# Patient Record
Sex: FEMALE | Race: WHITE | Employment: UNEMPLOYED | URBAN - METROPOLITAN AREA
[De-identification: names, ages, dates, MRNs, and addresses within clinical notes are randomized per-mention and may not be internally consistent; named-entity substitution may affect disease eponyms.]

---

## 2020-10-13 ENCOUNTER — HOSPITAL ENCOUNTER (OUTPATIENT)
Dept: PREADMISSION TESTING | Age: 60
Discharge: HOME | End: 2020-10-17
Payer: COMMERCIAL

## 2020-10-13 VITALS
DIASTOLIC BLOOD PRESSURE: 89 MMHG | WEIGHT: 185 LBS | BODY MASS INDEX: 31.58 KG/M2 | OXYGEN SATURATION: 97 % | HEART RATE: 88 BPM | TEMPERATURE: 96.7 F | HEIGHT: 64 IN | RESPIRATION RATE: 20 BRPM | SYSTOLIC BLOOD PRESSURE: 162 MMHG

## 2020-10-13 LAB
-: ABNORMAL
-: NORMAL
ABSOLUTE EOS #: 0.18 K/UL (ref 0–0.44)
ABSOLUTE IMMATURE GRANULOCYTE: 0.03 K/UL (ref 0–0.3)
ABSOLUTE LYMPH #: 4.57 K/UL (ref 1.1–3.7)
ABSOLUTE MONO #: 0.94 K/UL (ref 0.1–1.2)
AMORPHOUS: ABNORMAL
ANION GAP SERPL CALCULATED.3IONS-SCNC: 10 MMOL/L (ref 9–17)
BACTERIA: ABNORMAL
BASOPHILS # BLD: 0 % (ref 0–2)
BASOPHILS ABSOLUTE: 0.03 K/UL (ref 0–0.2)
BILIRUBIN URINE: NEGATIVE
BUN BLDV-MCNC: 14 MG/DL (ref 8–23)
BUN/CREAT BLD: 20 (ref 9–20)
CALCIUM SERPL-MCNC: 9.3 MG/DL (ref 8.6–10.4)
CASTS UA: ABNORMAL /LPF (ref 0–2)
CHLORIDE BLD-SCNC: 103 MMOL/L (ref 98–107)
CO2: 22 MMOL/L (ref 20–31)
COLOR: YELLOW
COMMENT UA: ABNORMAL
CREAT SERPL-MCNC: 0.69 MG/DL (ref 0.5–0.9)
CRYSTALS, UA: ABNORMAL /HPF
DIFFERENTIAL TYPE: ABNORMAL
EOSINOPHILS RELATIVE PERCENT: 2 % (ref 1–4)
EPITHELIAL CELLS UA: ABNORMAL /HPF (ref 0–5)
ESTIMATED AVERAGE GLUCOSE: 180 MG/DL
GFR AFRICAN AMERICAN: >60 ML/MIN
GFR NON-AFRICAN AMERICAN: >60 ML/MIN
GFR SERPL CREATININE-BSD FRML MDRD: ABNORMAL ML/MIN/{1.73_M2}
GFR SERPL CREATININE-BSD FRML MDRD: ABNORMAL ML/MIN/{1.73_M2}
GLUCOSE BLD-MCNC: 175 MG/DL (ref 70–99)
GLUCOSE URINE: NEGATIVE
HBA1C MFR BLD: 7.9 % (ref 4–6)
HCT VFR BLD CALC: 44 % (ref 36.3–47.1)
HEMOGLOBIN: 14.7 G/DL (ref 11.9–15.1)
IMMATURE GRANULOCYTES: 0 %
INR BLD: 1
KETONES, URINE: NEGATIVE
LEUKOCYTE ESTERASE, URINE: NEGATIVE
LYMPHOCYTES # BLD: 47 % (ref 24–43)
MCH RBC QN AUTO: 30.9 PG (ref 25.2–33.5)
MCHC RBC AUTO-ENTMCNC: 33.4 G/DL (ref 28.4–34.8)
MCV RBC AUTO: 92.4 FL (ref 82.6–102.9)
MONOCYTES # BLD: 9 % (ref 3–12)
MUCUS: ABNORMAL
NITRITE, URINE: NEGATIVE
NRBC AUTOMATED: 0 PER 100 WBC
OTHER OBSERVATIONS UA: ABNORMAL
PARTIAL THROMBOPLASTIN TIME: 19.1 SEC (ref 23.9–33.8)
PDW BLD-RTO: 13.6 % (ref 11.8–14.4)
PH UA: 6 (ref 5–8)
PLATELET # BLD: 268 K/UL (ref 138–453)
PLATELET ESTIMATE: ABNORMAL
PMV BLD AUTO: 12.9 FL (ref 8.1–13.5)
POTASSIUM SERPL-SCNC: 4.4 MMOL/L (ref 3.7–5.3)
PROTEIN UA: ABNORMAL
PROTHROMBIN TIME: 12.5 SEC (ref 11.5–14.2)
RBC # BLD: 4.76 M/UL (ref 3.95–5.11)
RBC # BLD: ABNORMAL 10*6/UL
RBC UA: ABNORMAL /HPF (ref 0–2)
REASON FOR REJECTION: NORMAL
RENAL EPITHELIAL, UA: ABNORMAL /HPF
SEG NEUTROPHILS: 42 % (ref 36–65)
SEGMENTED NEUTROPHILS ABSOLUTE COUNT: 4.23 K/UL (ref 1.5–8.1)
SODIUM BLD-SCNC: 135 MMOL/L (ref 135–144)
SPECIFIC GRAVITY UA: 1.02 (ref 1–1.03)
TRICHOMONAS: ABNORMAL
TURBIDITY: CLEAR
URINE HGB: NEGATIVE
UROBILINOGEN, URINE: NORMAL
WBC # BLD: 10 K/UL (ref 3.5–11.3)
WBC # BLD: ABNORMAL 10*3/UL
WBC UA: ABNORMAL /HPF (ref 0–5)
YEAST: ABNORMAL
ZZ NTE CLEAN UP: ORDERED TEST: NORMAL
ZZ NTE WITH NAME CLEAN UP: SPECIMEN SOURCE: NORMAL

## 2020-10-13 PROCEDURE — 85730 THROMBOPLASTIN TIME PARTIAL: CPT

## 2020-10-13 PROCEDURE — 81001 URINALYSIS AUTO W/SCOPE: CPT

## 2020-10-13 PROCEDURE — 85610 PROTHROMBIN TIME: CPT

## 2020-10-13 PROCEDURE — 80048 BASIC METABOLIC PNL TOTAL CA: CPT

## 2020-10-13 PROCEDURE — 83036 HEMOGLOBIN GLYCOSYLATED A1C: CPT

## 2020-10-13 PROCEDURE — 36415 COLL VENOUS BLD VENIPUNCTURE: CPT

## 2020-10-13 PROCEDURE — 87086 URINE CULTURE/COLONY COUNT: CPT

## 2020-10-13 PROCEDURE — 85025 COMPLETE CBC W/AUTO DIFF WBC: CPT

## 2020-10-13 PROCEDURE — 87641 MR-STAPH DNA AMP PROBE: CPT

## 2020-10-13 RX ORDER — TRIAMCINOLONE ACETONIDE 1 MG/ML
LOTION TOPICAL 3 TIMES DAILY
COMMUNITY

## 2020-10-13 RX ORDER — INSULIN GLARGINE 100 [IU]/ML
18 INJECTION, SOLUTION SUBCUTANEOUS NIGHTLY
COMMUNITY

## 2020-10-13 RX ORDER — PREGABALIN 75 MG/1
75 CAPSULE ORAL 2 TIMES DAILY
COMMUNITY

## 2020-10-13 RX ORDER — PRAVASTATIN SODIUM 10 MG
10 TABLET ORAL DAILY
COMMUNITY

## 2020-10-13 RX ORDER — ESTROGEN,CON/M-PROGEST ACET 0.625-2.5
1 TABLET ORAL DAILY
COMMUNITY

## 2020-10-13 RX ORDER — DICLOFENAC SODIUM 1 MG/ML
1 SOLUTION/ DROPS OPHTHALMIC 4 TIMES DAILY
COMMUNITY

## 2020-10-13 ASSESSMENT — PAIN DESCRIPTION - DESCRIPTORS: DESCRIPTORS: CONSTANT

## 2020-10-13 ASSESSMENT — PAIN DESCRIPTION - ORIENTATION: ORIENTATION: LOWER

## 2020-10-13 ASSESSMENT — PAIN DESCRIPTION - PAIN TYPE: TYPE: CHRONIC PAIN

## 2020-10-13 ASSESSMENT — PAIN DESCRIPTION - FREQUENCY: FREQUENCY: CONTINUOUS

## 2020-10-13 ASSESSMENT — PAIN DESCRIPTION - LOCATION: LOCATION: NECK

## 2020-10-13 ASSESSMENT — PAIN SCALES - GENERAL: PAINLEVEL_OUTOF10: 2

## 2020-10-13 NOTE — H&P
History and Physical Service   [facility] [place] - INPATIENT    HISTORY AND PHYSICAL EXAMINATION            Date of Evaluation: 10/13/2020  Patient name:  Heide Gross  MRN:   [id number]  YOB: 1960  PCP:    Nancy Saenz MD    History Obtained From:     Patient, medical records    History of Present Illness: This is Heide Gross a 56 y.o. female who presents to Pre Admission Testing for upcoming Anterior cervical laminectomy fusion C4-C6 by Dr. Jamaica Godoy for cervical spondylolisthesis scheduled 11/2/20 @ 0730  Patient c/o aching neck pain with headaches past several years that have progressively worsened. Admits neck \"locks up at times\" and \"I must manually move head out of position to realign\" This is accompanied by numbness in the left arm 5th finger  She follows with Dr Patsy Valdez with last visit 9/10/20 His progress notes indicated \"severe cervical stenosis C4-7 with cervical herniation at C4-C5 causing indentation of spinal cord\". Patient tried physical therapy, neck traction, and had spinal injections with pain management in past w/o significant improvement. She was referred for EMG 9/1/20 with Dr Marland Boxer which showed\" bilateral carpal tunnel and left cubital tunnel syndrome\". She takes Tylenol or Aleve as needed for relief of headache. Heat helps somewhat and cold makes it worse. Due to constant discomfort, surgery was advised. Hx Asthma, COPD and JESUS on CPAP nightly  Follows with Dr Paula Chatterjee. Ahmed. No home O2 or use of antiasthmatics. Denies dyspnea at rest or with exertion, cough, or wheezing. Hx pancreatic neuroendocrine tumor 10/4/19  S/p robotic distal pancreatectomy and splenectomy by Dr Marlena Pinto 11/26/19 with regular visits. (refer to Care Everywhere for extensive notes, post op recovery, complications and additional surgeries)  Follows with Oncologist Dr Ana Luisa Ramsey No chemotherapy  PET was negative per patient. Appointment 11/2020 with oncologist and repeat PET scan. Past Medical History:     Past Medical History:   Diagnosis Date    Arthritis     Asthma     Cancer ([facility] [address] 2019    neuroendocrine cancer    [facility]     Depression     Diabetes mellitus ([facility]     Hyperlipidemia     Hypertension     JESUS on CPAP     Sleep apnea         Past Surgical History:     Past Surgical History:   Procedure Laterality Date    ABDOMINOPLASTY      BREAST ENHANCEMENT SURGERY Bilateral     CHOLECYSTECTOMY      COLONOSCOPY      ENDOSCOPY, COLON, DIAGNOSTIC      KNEE ARTHROSCOPY Right     KNEE SURGERY Left     partial implant    NASAL SEPTUM SURGERY      PANCREAS SURGERY      partial     SHOULDER SURGERY Bilateral     rotattor cuff tightened    SKIN BIOPSY      SPLENECTOMY          Medications Prior to Admission:     Prior to Admission medications    Medication Sig Start Date End Date Taking? Authorizing Provider   estrogen, conjugated,-medroxyPROGESTERone (PREMPRO) 0.625-2.5 MG per tablet Take 1 tablet by mouth daily   Yes Historical Provider, MD   pregabalin (LYRICA) 75 MG capsule Take 75 mg by mouth 2 times daily. Yes Historical Provider, MD   pravastatin (PRAVACHOL) 10 MG tablet Take 10 mg by mouth daily   Yes Historical Provider, MD   insulin lispro (HUMALOG) 100 UNIT/ML injection vial Inject into the skin 3 times daily (before meals)   Yes Historical Provider, MD   insulin glargine (LANTUS) 100 UNIT/ML injection vial Inject 18 Units into the skin nightly   Yes Historical Provider, MD   diclofenac (VOLTAREN) 0.1 % ophthalmic solution 1 drop 4 times daily   Yes Historical Provider, MD   diclofenac sodium (VOLTAREN) 1 % GEL Apply 2 g topically 4 times daily as needed for Pain   Yes Historical Provider, MD   triamcinolone (KENALOG) 0.1 % lotion Apply topically 3 times daily Apply topically 3 times daily.    Yes Historical Provider, MD        Allergies:     Cellcept [mycophenolate mofetil]    Social History:     Tobacco:    reports that she quit smoking about 20 years ago. She smoked 1.00 pack per day. She has never used smokeless tobacco.  Alcohol:      reports current alcohol use. Drug Use:  reports no history of drug use. Family History:     History reviewed. No pertinent family history. Review of Systems:     Positive and Negative as described in HPI. CONSTITUTIONAL:  negative for fevers, chills, sweats, fatigue, weight loss  HEENT: lower left tooth capped 10/13/20 Advised to call Dentist if develops problems with tooth before surgery regular eye exams with Dr Parminder Umanzor Wears reading glasses  negative for vision, hearing changes, runny nose, throat pain  RESPIRATORY: +Asthma, COPD, JESUS using CPAP nightly Follows with Dr Ruthy Kirby. Ahmed negative for shortness of breath, cough, congestion, wheezing. CARDIOVASCULAR:  negative for chest pain, palpitations. GASTROINTESTINAL: See HPI Follows with Surgeon, Dr Murali Hagen and Onoclogisit Dr Ramos Gibbons negative for nausea, vomiting, diarrhea, constipation, change in bowel habits, abdominal pain   GENITOURINARY:  negative for difficulty of urination, burning with urination, frequency   BREAST: Hx saline breast implants 40 years ago currently leaking. Future surgery to be scheduled by Plastics, Dr Brent Orozco:  negative for rash, skin lesions, easy bruising   HEMATOLOGIC/LYMPHATIC:  negative for swelling/edema   ALLERGIC/IMMUNOLOGIC:  negative for urticaria , itching  ENDOCRINE: +DM Sugars run usually 100-200 averaging 140's. Follows with Dr Nayana Asif with appointment next week negative increase in drinking, increase in urination, hot or cold intolerance  MUSCULOSKELETAL: See HPI  s/p right knee meniscus repair Dr Jonathon Colón 9/18/20 Pain improved and healed well  negative muscle aches, swelling of joints  NEUROLOGICAL: + headaches with neck pain  negative for headaches, dizziness, lightheadedness, numbness, pain, tingling extremities  BEHAVIOR/PSYCH:  negative for depression, anxiety    Physical Exam:   BP (!) 162/89   Pulse 88   Temp 96.7 °F (35.9 °C)   Resp 20   Ht 5' 4\" (1.626 m)   Wt 185 lb (83.9 kg)   SpO2 97%   BMI 31.76 kg/m²   No LMP recorded. Patient is postmenopausal. No results for input(s): POCGLU in the last 72 hours. General Appearance:  alert, well appearing, and in no acute distress  Mental status: oriented to person, place, and time with normal affect  Head:  normocephalic, atraumatic. Eye: no icterus, redness, pupils equal and reactive, extraocular eye movements intact, conjunctiva clear  Ear: normal external ear, no discharge, hearing intact  Nose:  no drainage noted  Mouth: mucous membranes moist   Neck: supple, no carotid bruits, thyroid not palpable  Lungs: Bilateral equal air entry, unlabored, clear to ausculation, no wheezing, rales or rhonchi, normal effort  Cardiovascular: HR 88  normal rate, regular rhythm, no murmur, gallop, rub.   Abdomen: Soft, nontender, nondistended, normal bowel sounds   Neurologic: There are no new focal motor or sensory deficits, normal muscle tone and bulk, no abnormal sensation, normal speech, cranial nerves II through XII grossly intact Equal bilateral upper lower extremity strength  Skin: healed laparoscopic incisions right knee No gross visible lesions, rashes, bruising or bleeding on exposed skin area  Extremities/Neuro: decreased ROM cervical spine clicking with movement by patient  peripheral pulses palpable, no pedal edema or calf pain with palpation  Psych: normal affect     Investigations:      Laboratory Testing:  Recent Results (from the past 24 hour(s))   APTT    Collection Time: 10/13/20  2:23 PM   Result Value Ref Range    PTT 19.1 (L) 23.9 - 33.8 sec   PT    Collection Time: 10/13/20  2:23 PM   Result Value Ref Range    Protime 12.5 11.5 - 14.2 sec    INR 1.0    Basic Metabolic Prof    Collection Time: 10/13/20  2:23 PM   Result Value Ref Range    Glucose 175 (H) 70 - 99 mg/dL    BUN 14 8 - 23 mg/dL    CREATININE 0.69 0.50 - 0.90 mg/dL    Bun/Cre Ratio 20 9 - 20    Calcium 9.3 8.6 - 10.4 mg/dL    Sodium 135 135 - 144 mmol/L    Potassium 4.4 3.7 - 5.3 mmol/L    Chloride 103 98 - 107 mmol/L    CO2 22 20 - 31 mmol/L    Anion Gap 10 9 - 17 mmol/L    GFR Non-African American >60 >60 mL/min    GFR African American >60 >60 mL/min    GFR Comment          GFR Staging NOT REPORTED        Recent Labs     10/13/20  1423      K 4.4      CO2 22   BUN 14   CREATININE 0.69   GLUCOSE 175*   INR 1.0   PROTIME 12.5   APTT 19.1*       No results for input(s): COVID19 in the last 720 hours. Imaging/Diagnostics:    No results found. Diagnosis:      1. Cervical spondylolisthesis      Plans:     1.  Anterior cervical laminectomy fusion C4-C6        Chet Pitt APRN - CNP  10/13/2020  3:14 PM

## 2020-10-13 NOTE — PRE-PROCEDURE INSTRUCTIONS
Covid 10/29/2020 at 3:30pm (Lakisha Rodrigez in car)  [address] Monday,11/2/2020 at 5:30am  Please call [phone]    Once you enter the hospital lobby, take the elevators to the second floor. Check-In is at the surgery registration desk. Continue to take your home medications as you normally do up to and including the night before surgery with the exception of any blood thinning medications. Please stop any blood thinning medications as directed by your surgeon or prescribing physician. Failure to stop certain medications may interfere with your scheduled surgery. These may include:  Aspirin, Warfarin (Coumadin), Clopidogrel (Plavix), Ibuprofen (Motrin, Advil), Naproxen (Aleve), Meloxicam (Mobic), Celecoxib (Celebrex), Eliquis, Pradaxa, Xarelto, Effient, Fish Oil, Herbal supplements. none    If you are diabetic, do not take any of your diabetic medications by mouth the morning of surgery. If you are taking insulin contact the doctor that manages your diabetes for instructions about any changes to your insulin dosages the day before surgery. Do not inject insulin or other injectable diabetic medications the morning of surgery unless otherwise instructed by the doctor who manages your diabetes. Please take the following medication(s) the day of surgery with a small sip of water: none   Please use your inhalers at home the day of surgery. PREPARING FOR YOUR SURGERY:     Before surgery, you can play an important role in your own health. Because skin is not sterile, we need to be sure that your skin is as free of germs as possible before surgery by carefully washing before surgery. Preparing or prepping skin before surgery can reduce the risk of a surgical site infection.   Do not shave the area of your body where your surgery will be performed unless you received specific permission from your physician.     You will need to shower at home the night before surgery and the morning of your eyeglasses and case with you. No contacts are to be worn the day of surgery. You also may bring your hearing aids. Most surgical procedures involving anesthesia will require that you remove your dentures prior to surgery.  If you are on C-PAP or Bi-PAP at home and plan on staying in the hospital overnight for your surgery please bring the machine with you. · Do not wear any jewelry or body piercings day of surgery. Also, NO lotion, perfume or deodorant to be used the day of surgery. No nail polish on the operative extremity (arm/leg surgeries)    · Do not bring any valuables such as jewelry, cash, or credit cards. If you are staying overnight with us, please bring a small bag of personal items.  Please wear loose, comfortable clothing. If you are potentially going to have a cast or brace bring clothing that will fit over them.  In case of illness - If you have cold or flu like symptoms (high fever, runny nose, sore throat, cough, etc.) rash, nausea, vomiting, loose stools, and/or recent contact with someone who has a contagious disease (chicken pox, measles, etc.) Please call your doctor before coming to the hospital.     If your child is having surgery please make arrangements for any other children to be cared for at home on the day of surgery. Other children are not permitted in recovery room and we want you to be able to spend time with the patient. If other arrangements are not available then we suggest that you have a second adult to stay in the waiting room. Day of Surgery/Procedure:    As a patient at [facility] - INPATIENT you can expect quality medical and nursing care that is centered on your individual needs. Our goal is to make your surgical experience as comfortable as possible    . Transportation After Your Surgery/Procedure:     You will need a friend or family member to drive you home after your procedure. Your  must be 25years of age or older and able to sign off on your discharge instructions. A taxi cab or any other form of public transportation is not acceptable. Your friend or family member must stay at the hospital throughout your procedure. Someone must remain with you for the first 24 hours after your surgery if you receive anesthesia or medication. If you do not have someone to stay with you, your procedure may be cancelled.       If you have any other questions regarding your procedure or the day of surgery, please call [phone]      _________________________  ____________________________  Signature (Patient)              Signature (Provider) & date

## 2020-10-14 LAB
CULTURE: NORMAL
DIRECT EXAM: NORMAL
Lab: NORMAL
Lab: NORMAL
SPECIMEN DESCRIPTION: NORMAL
SPECIMEN DESCRIPTION: NORMAL

## 2020-10-29 ENCOUNTER — HOSPITAL ENCOUNTER (OUTPATIENT)
Dept: PREADMISSION TESTING | Age: 60
Setting detail: SPECIMEN
Discharge: HOME | End: 2020-11-02
Payer: COMMERCIAL

## 2020-10-29 PROCEDURE — U0003 INFECTIOUS AGENT DETECTION BY NUCLEIC ACID (DNA OR RNA); SEVERE ACUTE RESPIRATORY SYNDROME CORONAVIRUS 2 (SARS-COV-2) (CORONAVIRUS DISEASE [COVID-19]), AMPLIFIED PROBE TECHNIQUE, MAKING USE OF HIGH THROUGHPUT TECHNOLOGIES AS DESCRIBED BY CMS-2020-01-R: HCPCS

## 2020-10-31 LAB — SARS-COV-2, NAA: NOT DETECTED

## 2020-11-01 ENCOUNTER — ANESTHESIA EVENT (OUTPATIENT)
Dept: OPERATING ROOM | Age: 60
DRG: 473 | End: 2020-11-01
Payer: COMMERCIAL

## 2020-11-02 ENCOUNTER — APPOINTMENT (OUTPATIENT)
Dept: GENERAL RADIOLOGY | Age: 60
DRG: 473 | End: 2020-11-02
Attending: ORTHOPAEDIC SURGERY
Payer: COMMERCIAL

## 2020-11-02 ENCOUNTER — HOSPITAL ENCOUNTER (INPATIENT)
Age: 60
LOS: 1 days | Discharge: HOME | DRG: 473 | End: 2020-11-03
Attending: ORTHOPAEDIC SURGERY | Admitting: ORTHOPAEDIC SURGERY
Payer: COMMERCIAL

## 2020-11-02 ENCOUNTER — ANESTHESIA (OUTPATIENT)
Dept: OPERATING ROOM | Age: 60
DRG: 473 | End: 2020-11-02
Payer: COMMERCIAL

## 2020-11-02 VITALS — TEMPERATURE: 92.8 F | SYSTOLIC BLOOD PRESSURE: 177 MMHG | OXYGEN SATURATION: 93 % | DIASTOLIC BLOOD PRESSURE: 90 MMHG

## 2020-11-02 PROBLEM — E66.9 OBESITY (BMI 30-39.9): Status: ACTIVE | Noted: 2020-11-02

## 2020-11-02 PROBLEM — G47.33 OSA ON CPAP: Status: ACTIVE | Noted: 2020-11-02

## 2020-11-02 PROBLEM — M47.22 CERVICAL SPONDYLOSIS WITH RADICULOPATHY: Chronic | Status: ACTIVE | Noted: 2020-11-02

## 2020-11-02 PROBLEM — I10 UNCONTROLLED HYPERTENSION: Status: ACTIVE | Noted: 2020-11-02

## 2020-11-02 PROBLEM — Z99.89 OSA ON CPAP: Status: ACTIVE | Noted: 2020-11-02

## 2020-11-02 PROBLEM — E11.9 CONTROLLED TYPE 2 DIABETES MELLITUS WITHOUT COMPLICATION, WITH LONG-TERM CURRENT USE OF INSULIN (HCC): Status: ACTIVE | Noted: 2020-11-02

## 2020-11-02 PROBLEM — Z79.4 CONTROLLED TYPE 2 DIABETES MELLITUS WITHOUT COMPLICATION, WITH LONG-TERM CURRENT USE OF INSULIN (HCC): Status: ACTIVE | Noted: 2020-11-02

## 2020-11-02 LAB
ESTIMATED AVERAGE GLUCOSE: 174 MG/DL
GLUCOSE BLD-MCNC: 159 MG/DL (ref 65–105)
GLUCOSE BLD-MCNC: 209 MG/DL (ref 65–105)
GLUCOSE BLD-MCNC: 247 MG/DL (ref 65–105)
GLUCOSE BLD-MCNC: 277 MG/DL (ref 65–105)
HBA1C MFR BLD: 7.7 % (ref 4–6)

## 2020-11-02 PROCEDURE — 6360000002 HC RX W HCPCS: Performed by: ORTHOPAEDIC SURGERY

## 2020-11-02 PROCEDURE — 3700000000 HC ANESTHESIA ATTENDED CARE: Performed by: ORTHOPAEDIC SURGERY

## 2020-11-02 PROCEDURE — 6360000002 HC RX W HCPCS: Performed by: NURSE ANESTHETIST, CERTIFIED REGISTERED

## 2020-11-02 PROCEDURE — 0RG20A0 FUSION OF 2 OR MORE CERVICAL VERTEBRAL JOINTS WITH INTERBODY FUSION DEVICE, ANTERIOR APPROACH, ANTERIOR COLUMN, OPEN APPROACH: ICD-10-PCS | Performed by: ORTHOPAEDIC SURGERY

## 2020-11-02 PROCEDURE — 99254 IP/OBS CNSLTJ NEW/EST MOD 60: CPT | Performed by: INTERNAL MEDICINE

## 2020-11-02 PROCEDURE — 0RG2070 FUSION OF 2 OR MORE CERVICAL VERTEBRAL JOINTS WITH AUTOLOGOUS TISSUE SUBSTITUTE, ANTERIOR APPROACH, ANTERIOR COLUMN, OPEN APPROACH: ICD-10-PCS | Performed by: ORTHOPAEDIC SURGERY

## 2020-11-02 PROCEDURE — 6370000000 HC RX 637 (ALT 250 FOR IP): Performed by: ORTHOPAEDIC SURGERY

## 2020-11-02 PROCEDURE — C1713 ANCHOR/SCREW BN/BN,TIS/BN: HCPCS | Performed by: ORTHOPAEDIC SURGERY

## 2020-11-02 PROCEDURE — 3700000001 HC ADD 15 MINUTES (ANESTHESIA): Performed by: ORTHOPAEDIC SURGERY

## 2020-11-02 PROCEDURE — 2500000003 HC RX 250 WO HCPCS: Performed by: ORTHOPAEDIC SURGERY

## 2020-11-02 PROCEDURE — 2580000003 HC RX 258: Performed by: ANESTHESIOLOGY

## 2020-11-02 PROCEDURE — 7100000000 HC PACU RECOVERY - FIRST 15 MIN: Performed by: ORTHOPAEDIC SURGERY

## 2020-11-02 PROCEDURE — 83036 HEMOGLOBIN GLYCOSYLATED A1C: CPT

## 2020-11-02 PROCEDURE — 3600000004 HC SURGERY LEVEL 4 BASE: Performed by: ORTHOPAEDIC SURGERY

## 2020-11-02 PROCEDURE — 7100000001 HC PACU RECOVERY - ADDTL 15 MIN: Performed by: ORTHOPAEDIC SURGERY

## 2020-11-02 PROCEDURE — 2500000003 HC RX 250 WO HCPCS: Performed by: NURSE ANESTHETIST, CERTIFIED REGISTERED

## 2020-11-02 PROCEDURE — 2580000003 HC RX 258: Performed by: ORTHOPAEDIC SURGERY

## 2020-11-02 PROCEDURE — 1200000000 HC SEMI PRIVATE

## 2020-11-02 PROCEDURE — 2780000010 HC IMPLANT OTHER: Performed by: ORTHOPAEDIC SURGERY

## 2020-11-02 PROCEDURE — 82947 ASSAY GLUCOSE BLOOD QUANT: CPT

## 2020-11-02 PROCEDURE — 0RB30ZZ EXCISION OF CERVICAL VERTEBRAL DISC, OPEN APPROACH: ICD-10-PCS | Performed by: ORTHOPAEDIC SURGERY

## 2020-11-02 PROCEDURE — 36415 COLL VENOUS BLD VENIPUNCTURE: CPT

## 2020-11-02 PROCEDURE — 3209999900 FLUORO FOR SURGICAL PROCEDURES

## 2020-11-02 PROCEDURE — 6370000000 HC RX 637 (ALT 250 FOR IP): Performed by: INTERNAL MEDICINE

## 2020-11-02 PROCEDURE — 3600000014 HC SURGERY LEVEL 4 ADDTL 15MIN: Performed by: ORTHOPAEDIC SURGERY

## 2020-11-02 PROCEDURE — 2709999900 HC NON-CHARGEABLE SUPPLY: Performed by: ORTHOPAEDIC SURGERY

## 2020-11-02 PROCEDURE — 6360000002 HC RX W HCPCS: Performed by: ANESTHESIOLOGY

## 2020-11-02 PROCEDURE — 2720000010 HC SURG SUPPLY STERILE: Performed by: ORTHOPAEDIC SURGERY

## 2020-11-02 PROCEDURE — 2500000003 HC RX 250 WO HCPCS: Performed by: INTERNAL MEDICINE

## 2020-11-02 DEVICE — IMPLANT 4240864 ANATOMIC C 16X14X 8MM
Type: IMPLANTABLE DEVICE | Site: TRACHEA | Status: FUNCTIONAL
Brand: ANATOMIC PEEK CERVICAL FUSION SYSTEM

## 2020-11-02 DEVICE — IMPLANT 6240841 ANATOMIC 14X11X8MM
Type: IMPLANTABLE DEVICE | Site: NECK | Status: FUNCTIONAL
Brand: VERTE-STACK® SPINAL SYSTEM

## 2020-11-02 DEVICE — PLATE 3002037 ZEVO 37MM 2 LVL
Type: IMPLANTABLE DEVICE | Site: NECK | Status: FUNCTIONAL
Brand: ZEVO™ ANTERIOR CERVICAL PLATE SYSTEM

## 2020-11-02 DEVICE — GRAFT BNE SUB 1CC CELLULAR MTRX OSTEOCEL +: Type: IMPLANTABLE DEVICE | Site: NECK | Status: FUNCTIONAL

## 2020-11-02 RX ORDER — DEXTROSE MONOHYDRATE 50 MG/ML
100 INJECTION, SOLUTION INTRAVENOUS PRN
Status: DISCONTINUED | OUTPATIENT
Start: 2020-11-02 | End: 2020-11-03 | Stop reason: HOSPADM

## 2020-11-02 RX ORDER — SENNA AND DOCUSATE SODIUM 50; 8.6 MG/1; MG/1
1 TABLET, FILM COATED ORAL 2 TIMES DAILY
Status: DISCONTINUED | OUTPATIENT
Start: 2020-11-02 | End: 2020-11-03 | Stop reason: HOSPADM

## 2020-11-02 RX ORDER — OXYCODONE HYDROCHLORIDE AND ACETAMINOPHEN 5; 325 MG/1; MG/1
2 TABLET ORAL EVERY 4 HOURS PRN
Status: DISCONTINUED | OUTPATIENT
Start: 2020-11-02 | End: 2020-11-03 | Stop reason: HOSPADM

## 2020-11-02 RX ORDER — ROCURONIUM BROMIDE 10 MG/ML
INJECTION, SOLUTION INTRAVENOUS PRN
Status: DISCONTINUED | OUTPATIENT
Start: 2020-11-02 | End: 2020-11-02 | Stop reason: SDUPTHER

## 2020-11-02 RX ORDER — PREGABALIN 75 MG/1
75 CAPSULE ORAL 2 TIMES DAILY
Status: DISCONTINUED | OUTPATIENT
Start: 2020-11-02 | End: 2020-11-03 | Stop reason: HOSPADM

## 2020-11-02 RX ORDER — SODIUM CHLORIDE 0.9 % (FLUSH) 0.9 %
10 SYRINGE (ML) INJECTION PRN
Status: DISCONTINUED | OUTPATIENT
Start: 2020-11-02 | End: 2020-11-02 | Stop reason: HOSPADM

## 2020-11-02 RX ORDER — PHENYLEPHRINE HCL IN 0.9% NACL 1 MG/10 ML
SYRINGE (ML) INTRAVENOUS PRN
Status: DISCONTINUED | OUTPATIENT
Start: 2020-11-02 | End: 2020-11-02 | Stop reason: SDUPTHER

## 2020-11-02 RX ORDER — SODIUM CHLORIDE 9 MG/ML
INJECTION, SOLUTION INTRAVENOUS CONTINUOUS
Status: DISCONTINUED | OUTPATIENT
Start: 2020-11-03 | End: 2020-11-02

## 2020-11-02 RX ORDER — OXYCODONE HYDROCHLORIDE AND ACETAMINOPHEN 5; 325 MG/1; MG/1
1 TABLET ORAL
Status: DISCONTINUED | OUTPATIENT
Start: 2020-11-02 | End: 2020-11-02 | Stop reason: HOSPADM

## 2020-11-02 RX ORDER — LABETALOL HYDROCHLORIDE 5 MG/ML
20 INJECTION, SOLUTION INTRAVENOUS EVERY 4 HOURS PRN
Status: DISCONTINUED | OUTPATIENT
Start: 2020-11-02 | End: 2020-11-03 | Stop reason: HOSPADM

## 2020-11-02 RX ORDER — FENTANYL CITRATE 50 UG/ML
50 INJECTION, SOLUTION INTRAMUSCULAR; INTRAVENOUS EVERY 5 MIN PRN
Status: DISCONTINUED | OUTPATIENT
Start: 2020-11-02 | End: 2020-11-02 | Stop reason: HOSPADM

## 2020-11-02 RX ORDER — SODIUM CHLORIDE, SODIUM LACTATE, POTASSIUM CHLORIDE, CALCIUM CHLORIDE 600; 310; 30; 20 MG/100ML; MG/100ML; MG/100ML; MG/100ML
INJECTION, SOLUTION INTRAVENOUS CONTINUOUS
Status: DISCONTINUED | OUTPATIENT
Start: 2020-11-03 | End: 2020-11-02

## 2020-11-02 RX ORDER — SODIUM CHLORIDE 9 MG/ML
INJECTION, SOLUTION INTRAVENOUS CONTINUOUS
Status: DISCONTINUED | OUTPATIENT
Start: 2020-11-02 | End: 2020-11-02

## 2020-11-02 RX ORDER — KETAMINE HCL IN NACL, ISO-OSM 100MG/10ML
SYRINGE (ML) INJECTION PRN
Status: DISCONTINUED | OUTPATIENT
Start: 2020-11-02 | End: 2020-11-02 | Stop reason: SDUPTHER

## 2020-11-02 RX ORDER — MORPHINE SULFATE 2 MG/ML
2 INJECTION, SOLUTION INTRAMUSCULAR; INTRAVENOUS
Status: DISCONTINUED | OUTPATIENT
Start: 2020-11-02 | End: 2020-11-03 | Stop reason: HOSPADM

## 2020-11-02 RX ORDER — PROMETHAZINE HYDROCHLORIDE 12.5 MG/1
12.5 TABLET ORAL EVERY 6 HOURS PRN
Status: DISCONTINUED | OUTPATIENT
Start: 2020-11-02 | End: 2020-11-03 | Stop reason: HOSPADM

## 2020-11-02 RX ORDER — FENTANYL CITRATE 50 UG/ML
25 INJECTION, SOLUTION INTRAMUSCULAR; INTRAVENOUS EVERY 5 MIN PRN
Status: DISCONTINUED | OUTPATIENT
Start: 2020-11-02 | End: 2020-11-02 | Stop reason: HOSPADM

## 2020-11-02 RX ORDER — MIDAZOLAM HYDROCHLORIDE 1 MG/ML
INJECTION INTRAMUSCULAR; INTRAVENOUS PRN
Status: DISCONTINUED | OUTPATIENT
Start: 2020-11-02 | End: 2020-11-02 | Stop reason: SDUPTHER

## 2020-11-02 RX ORDER — GLYCOPYRROLATE 1 MG/5 ML
SYRINGE (ML) INTRAVENOUS PRN
Status: DISCONTINUED | OUTPATIENT
Start: 2020-11-02 | End: 2020-11-02 | Stop reason: SDUPTHER

## 2020-11-02 RX ORDER — FENTANYL CITRATE 50 UG/ML
INJECTION, SOLUTION INTRAMUSCULAR; INTRAVENOUS PRN
Status: DISCONTINUED | OUTPATIENT
Start: 2020-11-02 | End: 2020-11-02 | Stop reason: SDUPTHER

## 2020-11-02 RX ORDER — LIDOCAINE HYDROCHLORIDE 10 MG/ML
1 INJECTION, SOLUTION EPIDURAL; INFILTRATION; INTRACAUDAL; PERINEURAL
Status: DISCONTINUED | OUTPATIENT
Start: 2020-11-02 | End: 2020-11-02 | Stop reason: HOSPADM

## 2020-11-02 RX ORDER — PRAVASTATIN SODIUM 10 MG
10 TABLET ORAL DAILY
Status: DISCONTINUED | OUTPATIENT
Start: 2020-11-02 | End: 2020-11-03 | Stop reason: HOSPADM

## 2020-11-02 RX ORDER — INSULIN GLARGINE 100 [IU]/ML
18 INJECTION, SOLUTION SUBCUTANEOUS NIGHTLY
Status: DISCONTINUED | OUTPATIENT
Start: 2020-11-02 | End: 2020-11-03 | Stop reason: HOSPADM

## 2020-11-02 RX ORDER — ONDANSETRON 2 MG/ML
4 INJECTION INTRAMUSCULAR; INTRAVENOUS EVERY 6 HOURS PRN
Status: DISCONTINUED | OUTPATIENT
Start: 2020-11-02 | End: 2020-11-03 | Stop reason: HOSPADM

## 2020-11-02 RX ORDER — DEXAMETHASONE SODIUM PHOSPHATE 10 MG/ML
INJECTION, SOLUTION INTRAMUSCULAR; INTRAVENOUS PRN
Status: DISCONTINUED | OUTPATIENT
Start: 2020-11-02 | End: 2020-11-02 | Stop reason: SDUPTHER

## 2020-11-02 RX ORDER — OXYCODONE HYDROCHLORIDE AND ACETAMINOPHEN 5; 325 MG/1; MG/1
1 TABLET ORAL EVERY 4 HOURS PRN
Status: DISCONTINUED | OUTPATIENT
Start: 2020-11-02 | End: 2020-11-03 | Stop reason: HOSPADM

## 2020-11-02 RX ORDER — POLYETHYLENE GLYCOL 3350 17 G/17G
17 POWDER, FOR SOLUTION ORAL DAILY
Status: DISCONTINUED | OUTPATIENT
Start: 2020-11-02 | End: 2020-11-03 | Stop reason: HOSPADM

## 2020-11-02 RX ORDER — NICOTINE POLACRILEX 4 MG
15 LOZENGE BUCCAL PRN
Status: DISCONTINUED | OUTPATIENT
Start: 2020-11-02 | End: 2020-11-03 | Stop reason: HOSPADM

## 2020-11-02 RX ORDER — SODIUM CHLORIDE 0.9 % (FLUSH) 0.9 %
10 SYRINGE (ML) INJECTION EVERY 12 HOURS SCHEDULED
Status: DISCONTINUED | OUTPATIENT
Start: 2020-11-02 | End: 2020-11-02 | Stop reason: HOSPADM

## 2020-11-02 RX ORDER — ONDANSETRON 2 MG/ML
INJECTION INTRAMUSCULAR; INTRAVENOUS PRN
Status: DISCONTINUED | OUTPATIENT
Start: 2020-11-02 | End: 2020-11-02 | Stop reason: SDUPTHER

## 2020-11-02 RX ORDER — PROPOFOL 10 MG/ML
INJECTION, EMULSION INTRAVENOUS PRN
Status: DISCONTINUED | OUTPATIENT
Start: 2020-11-02 | End: 2020-11-02 | Stop reason: SDUPTHER

## 2020-11-02 RX ORDER — SODIUM CHLORIDE 0.9 % (FLUSH) 0.9 %
10 SYRINGE (ML) INJECTION PRN
Status: DISCONTINUED | OUTPATIENT
Start: 2020-11-02 | End: 2020-11-03 | Stop reason: HOSPADM

## 2020-11-02 RX ORDER — PROPOFOL 10 MG/ML
INJECTION, EMULSION INTRAVENOUS CONTINUOUS PRN
Status: DISCONTINUED | OUTPATIENT
Start: 2020-11-02 | End: 2020-11-02 | Stop reason: SDUPTHER

## 2020-11-02 RX ORDER — SODIUM CHLORIDE 0.9 % (FLUSH) 0.9 %
10 SYRINGE (ML) INJECTION EVERY 12 HOURS SCHEDULED
Status: DISCONTINUED | OUTPATIENT
Start: 2020-11-02 | End: 2020-11-03 | Stop reason: HOSPADM

## 2020-11-02 RX ORDER — TIZANIDINE 4 MG/1
4 TABLET ORAL EVERY 8 HOURS PRN
Status: DISCONTINUED | OUTPATIENT
Start: 2020-11-02 | End: 2020-11-03 | Stop reason: HOSPADM

## 2020-11-02 RX ORDER — LIDOCAINE HYDROCHLORIDE 20 MG/ML
INJECTION, SOLUTION EPIDURAL; INFILTRATION; INTRACAUDAL; PERINEURAL PRN
Status: DISCONTINUED | OUTPATIENT
Start: 2020-11-02 | End: 2020-11-02 | Stop reason: SDUPTHER

## 2020-11-02 RX ORDER — DEXTROSE MONOHYDRATE 25 G/50ML
12.5 INJECTION, SOLUTION INTRAVENOUS PRN
Status: DISCONTINUED | OUTPATIENT
Start: 2020-11-02 | End: 2020-11-03 | Stop reason: HOSPADM

## 2020-11-02 RX ORDER — ONDANSETRON 2 MG/ML
4 INJECTION INTRAMUSCULAR; INTRAVENOUS
Status: DISCONTINUED | OUTPATIENT
Start: 2020-11-02 | End: 2020-11-02 | Stop reason: HOSPADM

## 2020-11-02 RX ORDER — LOSARTAN POTASSIUM 50 MG/1
50 TABLET ORAL DAILY
Status: DISCONTINUED | OUTPATIENT
Start: 2020-11-02 | End: 2020-11-03

## 2020-11-02 RX ADMIN — CEFAZOLIN 2 G: 10 INJECTION, POWDER, FOR SOLUTION INTRAVENOUS at 07:46

## 2020-11-02 RX ADMIN — FENTANYL CITRATE 75 MCG: 50 INJECTION, SOLUTION INTRAMUSCULAR; INTRAVENOUS at 08:11

## 2020-11-02 RX ADMIN — DOCUSATE SODIUM 50MG AND SENNOSIDES 8.6MG 1 TABLET: 8.6; 5 TABLET, FILM COATED ORAL at 21:06

## 2020-11-02 RX ADMIN — ONDANSETRON 4 MG: 2 INJECTION, SOLUTION INTRAMUSCULAR; INTRAVENOUS at 07:40

## 2020-11-02 RX ADMIN — LABETALOL HYDROCHLORIDE 20 MG: 5 INJECTION INTRAVENOUS at 14:57

## 2020-11-02 RX ADMIN — OXYCODONE HYDROCHLORIDE AND ACETAMINOPHEN 2 TABLET: 5; 325 TABLET ORAL at 21:06

## 2020-11-02 RX ADMIN — INSULIN GLARGINE 18 UNITS: 100 INJECTION, SOLUTION SUBCUTANEOUS at 21:05

## 2020-11-02 RX ADMIN — FENTANYL CITRATE 50 MCG: 50 INJECTION, SOLUTION INTRAMUSCULAR; INTRAVENOUS at 10:10

## 2020-11-02 RX ADMIN — Medication 50 MG: at 07:50

## 2020-11-02 RX ADMIN — Medication 100 MCG: at 08:27

## 2020-11-02 RX ADMIN — DEXAMETHASONE SODIUM PHOSPHATE 10 MG: 10 INJECTION INTRAMUSCULAR; INTRAVENOUS at 07:40

## 2020-11-02 RX ADMIN — CEFAZOLIN SODIUM 2 G: 10 INJECTION, POWDER, FOR SOLUTION INTRAVENOUS at 13:00

## 2020-11-02 RX ADMIN — MORPHINE SULFATE 2 MG: 2 INJECTION, SOLUTION INTRAMUSCULAR; INTRAVENOUS at 14:49

## 2020-11-02 RX ADMIN — FENTANYL CITRATE 125 MCG: 50 INJECTION, SOLUTION INTRAMUSCULAR; INTRAVENOUS at 07:33

## 2020-11-02 RX ADMIN — Medication 50 MCG: at 11:33

## 2020-11-02 RX ADMIN — MORPHINE SULFATE 2 MG: 2 INJECTION, SOLUTION INTRAMUSCULAR; INTRAVENOUS at 17:29

## 2020-11-02 RX ADMIN — SODIUM CHLORIDE, POTASSIUM CHLORIDE, SODIUM LACTATE AND CALCIUM CHLORIDE: 600; 310; 30; 20 INJECTION, SOLUTION INTRAVENOUS at 09:56

## 2020-11-02 RX ADMIN — SODIUM CHLORIDE, POTASSIUM CHLORIDE, SODIUM LACTATE AND CALCIUM CHLORIDE: 600; 310; 30; 20 INJECTION, SOLUTION INTRAVENOUS at 06:23

## 2020-11-02 RX ADMIN — LOSARTAN POTASSIUM 50 MG: 50 TABLET, FILM COATED ORAL at 19:26

## 2020-11-02 RX ADMIN — ROCURONIUM BROMIDE 25 MG: 10 INJECTION, SOLUTION INTRAVENOUS at 09:15

## 2020-11-02 RX ADMIN — LIDOCAINE HYDROCHLORIDE 100 MG: 20 INJECTION, SOLUTION EPIDURAL; INFILTRATION; INTRACAUDAL; PERINEURAL at 07:34

## 2020-11-02 RX ADMIN — Medication 0.2 MG: at 07:40

## 2020-11-02 RX ADMIN — PROPOFOL 12 MCG/KG/MIN: 10 INJECTION, EMULSION INTRAVENOUS at 07:46

## 2020-11-02 RX ADMIN — INSULIN LISPRO 3 UNITS: 100 INJECTION, SOLUTION INTRAVENOUS; SUBCUTANEOUS at 21:06

## 2020-11-02 RX ADMIN — Medication 100 MCG: at 08:52

## 2020-11-02 RX ADMIN — CEFAZOLIN SODIUM 2 G: 10 INJECTION, POWDER, FOR SOLUTION INTRAVENOUS at 22:17

## 2020-11-02 RX ADMIN — SUGAMMADEX 200 MG: 100 INJECTION, SOLUTION INTRAVENOUS at 10:08

## 2020-11-02 RX ADMIN — MORPHINE SULFATE 2 MG: 2 INJECTION, SOLUTION INTRAMUSCULAR; INTRAVENOUS at 13:01

## 2020-11-02 RX ADMIN — PREGABALIN 75 MG: 75 CAPSULE ORAL at 21:06

## 2020-11-02 RX ADMIN — Medication 50 MCG: at 11:10

## 2020-11-02 RX ADMIN — ROCURONIUM BROMIDE 50 MG: 10 INJECTION, SOLUTION INTRAVENOUS at 07:34

## 2020-11-02 RX ADMIN — SODIUM CHLORIDE: 9 INJECTION, SOLUTION INTRAVENOUS at 13:00

## 2020-11-02 RX ADMIN — PROPOFOL 150 MG: 10 INJECTION, EMULSION INTRAVENOUS at 07:34

## 2020-11-02 RX ADMIN — ONDANSETRON 4 MG: 2 INJECTION INTRAMUSCULAR; INTRAVENOUS at 13:01

## 2020-11-02 RX ADMIN — Medication 50 MCG: at 10:52

## 2020-11-02 RX ADMIN — LABETALOL HYDROCHLORIDE 20 MG: 5 INJECTION INTRAVENOUS at 18:45

## 2020-11-02 RX ADMIN — INSULIN LISPRO 4 UNITS: 100 INJECTION, SOLUTION INTRAVENOUS; SUBCUTANEOUS at 17:30

## 2020-11-02 RX ADMIN — MIDAZOLAM 2 MG: 1 INJECTION INTRAMUSCULAR; INTRAVENOUS at 07:27

## 2020-11-02 ASSESSMENT — PAIN SCALES - GENERAL
PAINLEVEL_OUTOF10: 8
PAINLEVEL_OUTOF10: 9
PAINLEVEL_OUTOF10: 8
PAINLEVEL_OUTOF10: 10
PAINLEVEL_OUTOF10: 7
PAINLEVEL_OUTOF10: 9
PAINLEVEL_OUTOF10: 10
PAINLEVEL_OUTOF10: 8
PAINLEVEL_OUTOF10: 6
PAINLEVEL_OUTOF10: 6
PAINLEVEL_OUTOF10: 5

## 2020-11-02 ASSESSMENT — PULMONARY FUNCTION TESTS
PIF_VALUE: 21
PIF_VALUE: 23
PIF_VALUE: 22
PIF_VALUE: 19
PIF_VALUE: 24
PIF_VALUE: 22
PIF_VALUE: 21
PIF_VALUE: 0
PIF_VALUE: 23
PIF_VALUE: 22
PIF_VALUE: 17
PIF_VALUE: 22
PIF_VALUE: 23
PIF_VALUE: 19
PIF_VALUE: 22
PIF_VALUE: 16
PIF_VALUE: 18
PIF_VALUE: 18
PIF_VALUE: 21
PIF_VALUE: 22
PIF_VALUE: 22
PIF_VALUE: 23
PIF_VALUE: 19
PIF_VALUE: 21
PIF_VALUE: 23
PIF_VALUE: 23
PIF_VALUE: 20
PIF_VALUE: 20
PIF_VALUE: 23
PIF_VALUE: 22
PIF_VALUE: 22
PIF_VALUE: 21
PIF_VALUE: 16
PIF_VALUE: 21
PIF_VALUE: 23
PIF_VALUE: 3
PIF_VALUE: 23
PIF_VALUE: 23
PIF_VALUE: 21
PIF_VALUE: 23
PIF_VALUE: 19
PIF_VALUE: 23
PIF_VALUE: 19
PIF_VALUE: 23
PIF_VALUE: 16
PIF_VALUE: 0
PIF_VALUE: 23
PIF_VALUE: 19
PIF_VALUE: 24
PIF_VALUE: 24
PIF_VALUE: 22
PIF_VALUE: 23
PIF_VALUE: 1
PIF_VALUE: 20
PIF_VALUE: 1
PIF_VALUE: 2
PIF_VALUE: 23
PIF_VALUE: 19
PIF_VALUE: 22
PIF_VALUE: 22
PIF_VALUE: 23
PIF_VALUE: 22
PIF_VALUE: 22
PIF_VALUE: 16
PIF_VALUE: 23
PIF_VALUE: 24
PIF_VALUE: 2
PIF_VALUE: 23
PIF_VALUE: 22
PIF_VALUE: 24
PIF_VALUE: 23
PIF_VALUE: 22
PIF_VALUE: 23
PIF_VALUE: 22
PIF_VALUE: 19
PIF_VALUE: 22
PIF_VALUE: 23
PIF_VALUE: 22
PIF_VALUE: 23
PIF_VALUE: 22
PIF_VALUE: 16
PIF_VALUE: 23
PIF_VALUE: 23
PIF_VALUE: 21
PIF_VALUE: 22
PIF_VALUE: 1
PIF_VALUE: 16
PIF_VALUE: 1
PIF_VALUE: 23
PIF_VALUE: 1
PIF_VALUE: 18
PIF_VALUE: 23
PIF_VALUE: 24
PIF_VALUE: 2
PIF_VALUE: 16
PIF_VALUE: 22
PIF_VALUE: 23
PIF_VALUE: 22
PIF_VALUE: 23
PIF_VALUE: 3
PIF_VALUE: 0
PIF_VALUE: 20
PIF_VALUE: 24
PIF_VALUE: 23
PIF_VALUE: 23
PIF_VALUE: 1
PIF_VALUE: 23
PIF_VALUE: 22
PIF_VALUE: 23
PIF_VALUE: 24
PIF_VALUE: 19
PIF_VALUE: 20
PIF_VALUE: 23
PIF_VALUE: 23
PIF_VALUE: 4
PIF_VALUE: 21
PIF_VALUE: 21
PIF_VALUE: 23
PIF_VALUE: 22
PIF_VALUE: 0
PIF_VALUE: 21
PIF_VALUE: 4
PIF_VALUE: 24
PIF_VALUE: 21
PIF_VALUE: 23
PIF_VALUE: 17
PIF_VALUE: 22
PIF_VALUE: 22
PIF_VALUE: 23
PIF_VALUE: 3
PIF_VALUE: 22
PIF_VALUE: 22
PIF_VALUE: 19
PIF_VALUE: 23
PIF_VALUE: 22
PIF_VALUE: 23
PIF_VALUE: 22
PIF_VALUE: 22
PIF_VALUE: 23
PIF_VALUE: 23
PIF_VALUE: 22
PIF_VALUE: 22
PIF_VALUE: 3
PIF_VALUE: 6
PIF_VALUE: 23
PIF_VALUE: 21
PIF_VALUE: 22
PIF_VALUE: 23
PIF_VALUE: 21
PIF_VALUE: 23
PIF_VALUE: 1
PIF_VALUE: 22
PIF_VALUE: 23
PIF_VALUE: 21
PIF_VALUE: 14
PIF_VALUE: 23
PIF_VALUE: 21
PIF_VALUE: 1
PIF_VALUE: 23
PIF_VALUE: 19
PIF_VALUE: 19
PIF_VALUE: 23
PIF_VALUE: 19
PIF_VALUE: 23

## 2020-11-02 ASSESSMENT — PAIN DESCRIPTION - LOCATION
LOCATION: NECK

## 2020-11-02 ASSESSMENT — PAIN DESCRIPTION - FREQUENCY
FREQUENCY: CONTINUOUS

## 2020-11-02 ASSESSMENT — PAIN DESCRIPTION - ORIENTATION
ORIENTATION: ANTERIOR
ORIENTATION: LEFT
ORIENTATION: POSTERIOR
ORIENTATION: LEFT

## 2020-11-02 ASSESSMENT — PAIN DESCRIPTION - DESCRIPTORS
DESCRIPTORS: ACHING
DESCRIPTORS: ACHING
DESCRIPTORS: ACHING;SORE
DESCRIPTORS: ACHING;PRESSURE

## 2020-11-02 ASSESSMENT — PAIN DESCRIPTION - PAIN TYPE
TYPE: ACUTE PAIN;SURGICAL PAIN
TYPE: SURGICAL PAIN
TYPE: SURGICAL PAIN

## 2020-11-02 ASSESSMENT — PAIN - FUNCTIONAL ASSESSMENT
PAIN_FUNCTIONAL_ASSESSMENT: 0-10
PAIN_FUNCTIONAL_ASSESSMENT: PREVENTS OR INTERFERES SOME ACTIVE ACTIVITIES AND ADLS

## 2020-11-02 ASSESSMENT — PAIN DESCRIPTION - ONSET
ONSET: ON-GOING

## 2020-11-02 ASSESSMENT — ENCOUNTER SYMPTOMS: SHORTNESS OF BREATH: 0

## 2020-11-02 ASSESSMENT — PAIN DESCRIPTION - PROGRESSION
CLINICAL_PROGRESSION: NOT CHANGED

## 2020-11-02 NOTE — PLAN OF CARE
Problem: Discharge Planning:  Goal: Discharged to appropriate level of care  Description: Discharged to appropriate level of care  Outcome: Ongoing  Note: Pt plans on going home when discharged     Problem: Serum Glucose Level - Abnormal:  Goal: Ability to maintain appropriate glucose levels will improve  Description: Ability to maintain appropriate glucose levels will improve  Outcome: Ongoing  Note: Checking blood sugars before meals an d at bedtime and giving meds as ordered     Problem: Sensory Perception - Impaired:  Goal: Ability to maintain a stable neurologic state will improve  Description: Ability to maintain a stable neurologic state will improve  Outcome: Ongoing  Note: Pt is able to respond appropriately, denies any numbness or tingling to upper extremities,     Problem: Falls - Risk of:  Goal: Will remain free from falls  Description: Will remain free from falls  Outcome: Ongoing  Note: Patient is a fall risk during this admission. Fall risk assessment was performed. Patient is absent of falls. Bed is in the lowest position. Wheels on the bed are locked. Call light and bed side table are within reach. Clutter is removed. Patient was educated to call out when needing assistance or wanting to get out of bed. Patient offered toileting assistance during rounding. Hourly rounds have been performed. Fall precautions in place     Problem: Pain:  Goal: Pain level will decrease  Description: Pain level will decrease  Outcome: Ongoing  Note: Pain level assessment complete.    Patient educated on pain scale and control interventions  PRN pain medication given per patient request  Patient instructed to call out with new onset of pain or unrelieved pain , pt medicated with IV pain meds since OR with the ability to rest queitly with relaxed facial expression     Problem: Cerebrospinal Fluid Leakage - Risk Of:  Goal: Absence of restraint indications  Description: Absence of cerebrospinal fluid drainage at surgical site  Outcome: Ongoing     Problem: Infection - Surgical Site:  Goal: Will show no infection signs and symptoms  Description: Will show no infection signs and symptoms  Outcome: Ongoing     Problem: Mobility - Impaired:  Goal: Mobility will improve to maximum level  Description: Mobility will improve to maximum level  Outcome: Ongoing     Problem: Pain:  Goal: Pain level will decrease  Description: Pain level will decrease  Outcome: Ongoing  Note: Pain level assessment complete.    Patient educated on pain scale and control interventions  PRN pain medication given per patient request  Patient instructed to call out with new onset of pain or unrelieved pain , pt medicated with IV pain meds since OR with the ability to rest queitly with relaxed facial expression     Problem: Urinary Retention:  Goal: Urinary elimination within specified parameters  Description: Urinary elimination within specified parameters  Outcome: Ongoing  Note: Has voided twice since OR

## 2020-11-02 NOTE — PROGRESS NOTES
Physical Therapy  DATE: 2020    NAME: Kaci Garciallor  MRN: [id number]   : 1960    Patient not seen this date for Physical Therapy due to:  [] Blood transfusion in progress  [] Hemodialysis  []  Patient Declined  [] Spine Precautions   [] Strict Bedrest  [] Surgery/ Procedure  [] Testing      [x] Other RN cx; Pt. Complaining of large amts. Of pain and has high BP. She has already been up to bathroom 3 times. Check 11/3.        [] PT being discontinued at this time. Patient independent. No further needs. [] PT being discontinued at this time as the patient has been transferred to palliative care. No further needs.     Benton Khanna, PT

## 2020-11-02 NOTE — ANESTHESIA PRE PROCEDURE
Department of Anesthesiology  Preprocedure Note       Name:  Magnolia Pink   Age:  56 y.o.  :  1960                                          MRN:  [id number]         Date:  2020      Surgeon: Jaye Bhatt):  Isaak Jessica MD    Procedure: Procedure(s):  C4-6 ACDF 1 JAMES   [facility]    Medications prior to admission:   Prior to Admission medications    Medication Sig Start Date End Date Taking? Authorizing Provider   estrogen, conjugated,-medroxyPROGESTERone (PREMPRO) 0.625-2.5 MG per tablet Take 1 tablet by mouth daily   Yes Historical Provider, MD   pregabalin (LYRICA) 75 MG capsule Take 75 mg by mouth 2 times daily. Yes Historical Provider, MD   pravastatin (PRAVACHOL) 10 MG tablet Take 10 mg by mouth daily   Yes Historical Provider, MD   insulin lispro (HUMALOG) 100 UNIT/ML injection vial Inject into the skin 3 times daily (before meals)   Yes Historical Provider, MD   insulin glargine (LANTUS) 100 UNIT/ML injection vial Inject 18 Units into the skin nightly   Yes Historical Provider, MD   diclofenac (VOLTAREN) 0.1 % ophthalmic solution 1 drop 4 times daily   Yes Historical Provider, MD   diclofenac sodium (VOLTAREN) 1 % GEL Apply 2 g topically 4 times daily as needed for Pain   Yes Historical Provider, MD   triamcinolone (KENALOG) 0.1 % lotion Apply topically 3 times daily Apply topically 3 times daily.    Yes Historical Provider, MD       Current medications:    Current Facility-Administered Medications   Medication Dose Route Frequency Provider Last Rate Last Dose    [START ON 11/3/2020] lactated ringers infusion   Intravenous Continuous Soledad De Los Santos  mL/hr at 20 0623      sodium chloride flush 0.9 % injection 10 mL  10 mL Intravenous 2 times per day Bernard Snyder MD        sodium chloride flush 0.9 % injection 10 mL  10 mL Intravenous PRN Bernard Snyder MD        lidocaine PF 1 % injection 1 mL  1 mL Intradermal Once PRN Bernard Snyder MD        ceFAZolin (ANCEF) 2 g in dextrose 5 % 50 mL IVPB  2 g Intravenous Once Zack Jamison MD           Allergies: Allergies   Allergen Reactions    Cellcept [Mycophenolate Mofetil] Itching       Problem List:  There is no problem list on file for this patient.       Past Medical History:        Diagnosis Date    Arthritis     Asthma     Cancer ([facility] 2019    neuroendocrine cancer    COPD (chronic obstructive pulmonary disease) ([facility]     Depression     Diabetes mellitus ([facility]     Hyperlipidemia     Hypertension     JESUS on CPAP     Renal stone 10/2020-2020    Sleep apnea        Past Surgical History:        Procedure Laterality Date    ABDOMINOPLASTY      BREAST ENHANCEMENT SURGERY Bilateral     CHOLECYSTECTOMY      COLONOSCOPY      ENDOSCOPY, COLON, DIAGNOSTIC      KNEE ARTHROSCOPY Right     KNEE SURGERY Left     partial implant    NASAL SEPTUM SURGERY      PANCREAS SURGERY      partial     SHOULDER SURGERY Bilateral     rotattor cuff tightened    SKIN BIOPSY      SPLENECTOMY         Social History:    Social History     Tobacco Use    Smoking status: Former Smoker     Packs/day: 1.00     Last attempt to quit: 10/13/2000     Years since quittin.0    Smokeless tobacco: Never Used   Substance Use Topics    Alcohol use: Yes     Comment: once a year                                Counseling given: Not Answered      Vital Signs (Current):   Vitals:    20 0600 20 0615 20 0629   BP: (!) 170/87  (!) 169/78   Pulse: 71     Resp: 18     Temp: 97.3 °F (36.3 °C)     TempSrc: Temporal     SpO2: 98%     Weight:  185 lb (83.9 kg)    Height:  5' 4\" (1.626 m)                                               BP Readings from Last 3 Encounters:   20 (!) 169/78   10/13/20 (!) 162/89       NPO Status: Time of last liquid consumption:                         Time of last solid consumption:                         Date of last liquid consumption: 20                        Date of last solid food consumption: 11/01/20    BMI:   Wt Readings from Last 3 Encounters:   11/02/20 185 lb (83.9 kg)   10/13/20 185 lb (83.9 kg)     Body mass index is 31.76 kg/m². CBC:   Lab Results   Component Value Date    WBC 10.0 10/13/2020    RBC 4.76 10/13/2020    HGB 14.7 10/13/2020    HCT 44.0 10/13/2020    MCV 92.4 10/13/2020    RDW 13.6 10/13/2020     10/13/2020       CMP:   Lab Results   Component Value Date     10/13/2020    K 4.4 10/13/2020     10/13/2020    CO2 22 10/13/2020    BUN 14 10/13/2020    CREATININE 0.69 10/13/2020    GFRAA >60 10/13/2020    LABGLOM >60 10/13/2020    GLUCOSE 175 10/13/2020    CALCIUM 9.3 10/13/2020       POC Tests:   Recent Labs     11/02/20  0616   POCGLU 159*       Coags:   Lab Results   Component Value Date    PROTIME 12.5 10/13/2020    INR 1.0 10/13/2020    APTT 19.1 10/13/2020       HCG (If Applicable): No results found for: PREGTESTUR, PREGSERUM, HCG, HCGQUANT     ABGs: No results found for: PHART, PO2ART, USK0MTB, CZN1PKR, BEART, M8VNIBMD     Type & Screen (If Applicable):  No results found for: LABABO, LABRH    Drug/Infectious Status (If Applicable):  No results found for: HIV, HEPCAB    COVID-19 Screening (If Applicable):   Lab Results   Component Value Date    COVID19 Not Detected 10/29/2020         Anesthesia Evaluation    Airway: Mallampati: I  TM distance: >3 FB   Neck ROM: full  Mouth opening: > = 3 FB Dental:          Pulmonary:   (+) COPD:  sleep apnea:      (-) shortness of breath                           Cardiovascular:    (+) hypertension:,                   Neuro/Psych:               GI/Hepatic/Renal:             Endo/Other:    (+) Diabetes, . Abdominal:           Vascular:                                        Anesthesia Plan      general     ASA 2             Anesthetic plan and risks discussed with patient.                       Juanjose Perry MD   11/2/2020

## 2020-11-02 NOTE — ANESTHESIA POSTPROCEDURE EVALUATION
Department of Anesthesiology  Postprocedure Note    Patient: Magnolia Pink  MRN: [id number]  YOB: 1960  Date of evaluation: 11/2/2020  Time:  1:09 PM     Procedure Summary     Date:  11/02/20 Room / Location:  [address] [place] [id number] - INPATIENT    Anesthesia Start:  0850 Anesthesia Stop:  2157    Procedure:  C4-6 ACDF 1 JAMES   [facility] (N/A Neck) Diagnosis:  (DX CERVICAL SPONDYLOLITHESIS)    Surgeon:  Isaak Jessica MD Responsible Provider:  Fam Overton MD    Anesthesia Type:  general ASA Status:  2          Anesthesia Type: general    Hollie Phase I: Hollie Score: 10    Hollie Phase II:      Last vitals: Reviewed and per EMR flowsheets.        Anesthesia Post Evaluation    Complications: no

## 2020-11-02 NOTE — BRIEF OP NOTE
Brief Postoperative Note      Patient: Janine Zamora  YOB: 1960  MRN: [id number]    Date of Procedure: 11/2/2020    Pre-Op Diagnosis: DX CERVICAL SPONDYLOSIS WITH RADICULOPATHY C4-6    Post-Op Diagnosis: Same       Procedure(s):  C4-6 ACDF 1 JAMES   [employer]    Surgeon(s):  Elizabeth Banks MD    Assistant:  First Assistant: Isaak Rondon    Anesthesia: General    Estimated Blood Loss (mL): less than 50     Complications: None    Specimens:   * No specimens in log *    Implants:  Implant Name Type Inv. Item Serial No.  Lot No. LRB No. Used Action   GRAFT BNE SUB 1CC CELLULAR MTRX OSTEOCEL + - [id number]  GRAFT BNE SUB 1CC CELLULAR MTRX OSTEOCEL + [id number] [employer]WD [id number] N/A 1 Implanted   CAGE SPNL [id number] D14MM CERV INTBDY FUS PEEK OPTMA  CAGE SPNL [id number] D14MM CERV INTBDY FUS PEEK OPTMA  [employer] [place] [employer]WD [id number] N/A 1 Implanted   SPACER SPNL [id number] 0DEG PEEK TI MRK PARA ANT THORLUM  SPACER SPNL [id number] 0DEG PEEK TI MRK PARA ANT THORLUM  [employer] SOFAMOR DANEK-WD [id number] N/A 1 Implanted   PLATE SPNL M13VP LEV 2 ANT CERV TI ZEVO  PLATE SPNL [id number] LEV 2 ANT CERV TI ZEVO  [employer] SOFAMOR DANEK-WD  N/A 1 Implanted   SCREW SPNL L15MM DIA3. 5MM ANT CERV TI SELF DRL DAVON ANG  SCREW SPNL L15MM DIA3. 5MM ANT CERV TI SELF DRL DAVON ANG  [employer] SOFAMOR DANEK-WD  N/A 6 Implanted         Drains:   Closed/Suction Drain Left Neck Accordion 7 Wolof (Active)       Urethral Catheter Double-lumen;Non-latex;Straight-tip 16 fr (Active)       Electronically signed by Yelitza Agarwal MD on 11/2/2020 at 10:27 AM

## 2020-11-02 NOTE — PROGRESS NOTES
Pt admitted to room. Oriented to room, call light and bed mechanics. Side rails up x2. Call light within reach. Orders reviewed.  Pt up to the bathroom and voided, tolerated activity well

## 2020-11-02 NOTE — H&P
History and Physical Update    Pt Name: Magnolia Pink  MRN: [id number]  YOB: 1960  Date of evaluation: 11/2/2020      [x] I have reviewed the H & P found in Epic by MIHAI Huynh from 10/13/2020 which meets the criteria for an Interval History and Physical note. [x] I have examined  aMgnolia Pink a 56 y.o., female who is scheduled for a C4-6 ACDF by Dr. Simon Martínez due to cervical spondylolisthesis. The patient denies health changes since her appointment with MIHAI Huynh on 10/13/2020 except for developing a left renal stone. Pt states she does not know if she passed the renal stone. She has nocturia, urinary frequency, and urgency. Pt denies urinary incontinence, hematuria, retention, dysuria, fever, chills, productive cough, SOB, chest pain, rashes, and wounds. She has scratches on her left arm from her cat. History of diabetes. Pt's POC blood glucose today is 159. Voltaren gel was last used a few weeks ago. Aleve and Advil were last taken a couple months ago. Vital signs: BP (!) 169/78   Pulse 71   Temp 97.3 °F (36.3 °C) (Temporal)   Resp 18   Ht 5' 4\" (1.626 m)   Wt 185 lb (83.9 kg)   SpO2 98%   BMI 31.76 kg/m²      Allergies:  Cellcept [mycophenolate mofetil]    Past medical history, surgical history, social history, and family history were reviewed and updated in EPIC as indicated. Medications:    Prior to Admission medications    Medication Sig Start Date End Date Taking? Authorizing Provider   estrogen, conjugated,-medroxyPROGESTERone (PREMPRO) 0.625-2.5 MG per tablet Take 1 tablet by mouth daily   Yes Historical Provider, MD   pregabalin (LYRICA) 75 MG capsule Take 75 mg by mouth 2 times daily.    Yes Historical Provider, MD   pravastatin (PRAVACHOL) 10 MG tablet Take 10 mg by mouth daily   Yes Historical Provider, MD   insulin lispro (HUMALOG) 100 UNIT/ML injection vial Inject into the skin 3 times daily (before meals)   Yes Historical Provider, MD insulin glargine (LANTUS) 100 UNIT/ML injection vial Inject 18 Units into the skin nightly   Yes Historical Provider, MD   diclofenac (VOLTAREN) 0.1 % ophthalmic solution 1 drop 4 times daily   Yes Historical Provider, MD   diclofenac sodium (VOLTAREN) 1 % GEL Apply 2 g topically 4 times daily as needed for Pain   Yes Historical Provider, MD   triamcinolone (KENALOG) 0.1 % lotion Apply topically 3 times daily Apply topically 3 times daily. Yes Historical Provider, MD       This is a 56 y.o. female who is pleasant, cooperative, alert and oriented x 3, in no acute distress. Multiple small scabbed scratches on her left arm. Obese. Heart: Regular rate and rhythm without murmur, gallop, or rub. Lungs: Normal respiratory effort, unlabored, and clear to auscultation without wheezes or rales bilaterally. Abdomen: Soft, non-tender, non-distended with active bowel sounds. Pedal pulses: are palpable bilaterally. Genitourinary: No CVA tenderness.       Labs:  Recent Labs     10/13/20  1423   HGB 14.7   HCT 44.0   WBC 10.0   MCV 92.4         K 4.4      CO2 22   BUN 14   CREATININE 0.69   GLUCOSE 175*   INR 1.0   PROTIME 12.5   APTT 19.1*       Recent Labs     10/29/20  [address] Not Detected         Cj LyleVIRGEN May  Electronically signed 11/2/2020 at 6:36 AM      SOREN Hernandez CNP    Nurse Practitioner    General Surgery    H&P    Signed    Date of Service:  10/13/2020  2:00 PM          Related encounter: Pre-Admission Testing Visit from 10/13/2020 in [place] [address] PRE-ADMIT TESTING          Signed        Expand All Collapse All     Show:Clear all  [x]Manual[x]Template[]Copied    Added by:  [x]SOREN Stone CNP    []Goran for details  History and Physical Service   [facility]            Date of Evaluation:     10/13/2020  Patient name:              Mitali Oneil  MRN:                           [id number]  Date of Birth:               1960  PCP:                            Ilya Giraldo MD     History Obtained From:      Patient, medical records     History of Present Illness: This is Mireya Franks a 56 y.o. female who presents to Pre Admission Testing for upcoming Anterior cervical laminectomy fusion C4-C6 by Dr. Angelina Lawson for cervical spondylolisthesis scheduled 11/2/20 @ 0730  Patient c/o aching neck pain with headaches past several years that have progressively worsened. Admits neck \"locks up at times\" and \"I must manually move head out of position to realign\" This is accompanied by numbness in the left arm 5th finger  She follows with Dr aTyler Mueller with last visit 9/10/20 His progress notes indicated \"severe cervical stenosis C4-7 with cervical herniation at C4-C5 causing indentation of spinal cord\". Patient tried physical therapy, neck traction, and had spinal injections with pain management in past w/o significant improvement. She was referred for EMG 9/1/20 with Dr Tomás Luna which showed\" bilateral carpal tunnel and left cubital tunnel syndrome\". She takes Tylenol or Aleve as needed for relief of headache. Heat helps somewhat and cold makes it worse. Due to constant discomfort, surgery was advised. Hx Asthma, COPD and JESUS on CPAP nightly  [facility] No home O2 or use of antiasthmatics. Denies dyspnea at rest or with exertion, cough, or wheezing. Hx pancreatic neuroendocrine tumor 10/4/19  S/p robotic distal pancreatectomy and splenectomy by Dr Rita Shi 11/26/19 with regular visits. (refer to Care Everywhere for extensive notes, post op recovery, complications and additional surgeries)  Follows with Oncologist Dr Faye Killian No chemotherapy  PET was negative per patient. Appointment 11/2020 with oncologist and repeat PET scan.       Past Medical History:      Past Medical History        Past Medical History:   Diagnosis Date    Arthritis      Asthma      Cancer ([facility] [address] 2019 neuroendocrine cancer    COPD (chronic obstructive pulmonary disease) (HCC)      Depression      Diabetes mellitus ([facility] [address]      Hyperlipidemia      Hypertension      JESUS on CPAP      Sleep apnea              Past Surgical History:      Past Surgical History         Past Surgical History:   Procedure Laterality Date    ABDOMINOPLASTY        BREAST ENHANCEMENT SURGERY Bilateral      CHOLECYSTECTOMY        COLONOSCOPY        ENDOSCOPY, COLON, DIAGNOSTIC        KNEE ARTHROSCOPY Right      KNEE SURGERY Left       partial implant    NASAL SEPTUM SURGERY        PANCREAS SURGERY         partial     SHOULDER SURGERY Bilateral       rotattor cuff tightened    SKIN BIOPSY        SPLENECTOMY                Medications Prior to Admission:      Home Medications           Prior to Admission medications    Medication Sig Start Date End Date Taking? Authorizing Provider   estrogen, conjugated,-medroxyPROGESTERone (PREMPRO) 0.625-2.5 MG per tablet Take 1 tablet by mouth daily     Yes Historical Provider, MD   pregabalin (LYRICA) 75 MG capsule Take 75 mg by mouth 2 times daily. Yes Historical Provider, MD   pravastatin (PRAVACHOL) 10 MG tablet Take 10 mg by mouth daily     Yes Historical Provider, MD   insulin lispro (HUMALOG) 100 UNIT/ML injection vial Inject into the skin 3 times daily (before meals)     Yes Historical Provider, MD   insulin glargine (LANTUS) 100 UNIT/ML injection vial Inject 18 Units into the skin nightly     Yes Historical Provider, MD   diclofenac (VOLTAREN) 0.1 % ophthalmic solution 1 drop 4 times daily     Yes Historical Provider, MD   diclofenac sodium (VOLTAREN) 1 % GEL Apply 2 g topically 4 times daily as needed for Pain     Yes Historical Provider, MD   triamcinolone (KENALOG) 0.1 % lotion Apply topically 3 times daily Apply topically 3 times daily.      Yes Historical Provider, MD            Allergies:      Cellcept [mycophenolate mofetil]     Social History:      Tobacco:    reports that she quit smoking about 20 years ago. She smoked 1.00 pack per day. She has never used smokeless tobacco.  Alcohol:      reports current alcohol use. Drug Use:  reports no history of drug use. Family History:      Family History   History reviewed. No pertinent family history. Review of Systems:      Positive and Negative as described in HPI. CONSTITUTIONAL:  negative for fevers, chills, sweats, fatigue, weight loss  HEENT: lower left tooth capped 10/13/20 Advised to call Dentist if develops problems with tooth before surgery regular eye exams with Dr Sole Puentes Wears reading glasses  negative for vision, hearing changes, runny nose, throat pain  RESPIRATORY: +Asthma, COPD, JESUS using CPAP nightly Follows with Dr Sheila Patiño. Ahmed negative for shortness of breath, cough, congestion, wheezing. CARDIOVASCULAR:  negative for chest pain, palpitations. GASTROINTESTINAL: See HPI Follows with Surgeon, Dr Consuelo Solis and On[facility]log[facility] Dr Markus Guardado negative for nausea, vomiting, diarrhea, constipation, change in bowel habits, abdominal pain   GENITOURINARY:  negative for difficulty of urination, burning with urination, frequency   BREAST: Hx saline breast implants 40 years ago currently leaking. Future surgery to be scheduled by Plastics, Dr Breann Reveles:  negative for rash, skin lesions, easy bruising   HEMATOLOGIC/LYMPHATIC:  negative for swelling/edema   ALLERGIC/IMMUNOLOGIC:  negative for urticaria , itching  ENDOCRINE: +DM Sugars run usually 100-200 averaging 140's. Follows with Dr Leoncio Asif with appointment next week negative increase in drinking, increase in urination, hot or cold intolerance  MUSCULOSKELETAL: See HPI  s/p right knee meniscus repair Dr Kacie Escamilla 9/18/20 Pain improved and healed well  negative muscle aches, swelling of joints  NEUROLOGICAL: + headaches with neck pain  negative for headaches, dizziness, lightheadedness, numbness, pain, tingling extremities  BEHAVIOR/PSYCH:  negative for depression, anxiety     Physical Exam:   BP (!) 162/89   Pulse 88   Temp 96.7 °F (35.9 °C)   Resp 20   Ht 5' 4\" (1.626 m)   Wt 185 lb (83.9 kg)   SpO2 97%   BMI 31.76 kg/m²   No LMP recorded. Patient is postmenopausal. No results for input(s): POCGLU in the last 72 hours. General Appearance:  alert, well appearing, and in no acute distress  Mental status: oriented to person, place, and time with normal affect  Head:  normocephalic, atraumatic. Eye: no icterus, redness, pupils equal and reactive, extraocular eye movements intact, conjunctiva clear  Ear: normal external ear, no discharge, hearing intact  Nose:  no drainage noted  Mouth: mucous membranes moist   Neck: supple, no carotid bruits, thyroid not palpable  Lungs: Bilateral equal air entry, unlabored, clear to ausculation, no wheezing, rales or rhonchi, normal effort  Cardiovascular: HR 88  normal rate, regular rhythm, no murmur, gallop, rub.   Abdomen: Soft, nontender, nondistended, normal bowel sounds   Neurologic: There are no new focal motor or sensory deficits, normal muscle tone and bulk, no abnormal sensation, normal speech, cranial nerves II through XII grossly intact Equal bilateral upper lower extremity strength  Skin: healed laparoscopic incisions right knee No gross visible lesions, rashes, bruising or bleeding on exposed skin area  Extremities/Neuro: decreased ROM cervical spine clicking with movement by patient  peripheral pulses palpable, no pedal edema or calf pain with palpation  Psych: normal affect      Investigations:       Laboratory Testing:  Recent Results         Recent Results (from the past 24 hour(s))   APTT     Collection Time: 10/13/20  2:23 PM   Result Value Ref Range     PTT 19.1 (L) 23.9 - 33.8 sec   PT     Collection Time: 10/13/20  2:23 PM   Result Value Ref Range     Protime 12.5 11.5 - 14.2 sec     INR 1.0     Basic Metabolic Prof     Collection Time: 10/13/20  2:23 PM   Result Value Ref Range     Glucose 175 (H) 70 - 99 mg/dL     BUN 14 8 - 23 mg/dL     CREATININE 0.69 0.50 - 0.90 mg/dL     Bun/Cre Ratio 20 9 - 20     Calcium 9.3 8.6 - 10.4 mg/dL     Sodium 135 135 - 144 mmol/L     Potassium 4.4 3.7 - 5.3 mmol/L     Chloride 103 98 - 107 mmol/L     CO2 22 20 - 31 mmol/L     Anion Gap 10 9 - 17 mmol/L     GFR Non-African American >60 >60 mL/min     GFR African American >60 >60 mL/min     GFR Comment            GFR Staging NOT REPORTED                 Recent Labs     10/13/20  1423      K 4.4      CO2 22   BUN 14   CREATININE 0.69   GLUCOSE 175*   INR 1.0   PROTIME 12.5   APTT 19.1*         No results for input(s): COVID19 in the last 720 hours. Imaging/Diagnostics:     No results found. Diagnosis:       1. Cervical spondylolisthesis        Plans:      1.  Anterior cervical laminectomy fusion C4-C6           SOREN King - CNP  10/13/2020  3:14 PM            Cosigned by:  Diana Stinson MD at 10/14/2020 10:18 PM    Revision History     Routing History

## 2020-11-02 NOTE — CONSULTS
maintained on insulin therapy. Patient was seen in the postoperative period. She states that her neck is sore from surgery today. She notes that her voice is somewhat weak. She is currently in a c-collar. Past Medical History:     Past Medical History:   Diagnosis Date    Arthritis     Asthma     Cancer ([facility] [address] 2019    neuroendocrine cancer    COPD (chronic obstructive pulmonary disease) ([facility] [address]     Depression     Diabetes mellitus ([facility] [address]     Hyperlipidemia     Hypertension     JESUS on CPAP     Renal stone 10/2020-11/2020    Sleep apnea         Past Surgical History:     Past Surgical History:   Procedure Laterality Date    ABDOMINOPLASTY      BREAST ENHANCEMENT SURGERY Bilateral     CERVICAL FUSION N/A 11/2/2020    C4-6 ACDF 1 JAMES   [facility] performed by Juno Coyle MD at [address] COLONOSCOPY      ENDOSCOPY, COLON, DIAGNOSTIC      KNEE ARTHROSCOPY Right     KNEE SURGERY Left     partial implant    NASAL SEPTUM SURGERY      PANCREAS SURGERY      partial     SHOULDER SURGERY Bilateral     rotattor cuff tightened    SKIN BIOPSY      SPLENECTOMY          Medications Prior to Admission:     Prior to Admission medications    Medication Sig Start Date End Date Taking? Authorizing Provider   estrogen, conjugated,-medroxyPROGESTERone (PREMPRO) 0.625-2.5 MG per tablet Take 1 tablet by mouth daily   Yes Historical Provider, MD   pregabalin (LYRICA) 75 MG capsule Take 75 mg by mouth 2 times daily.    Yes Historical Provider, MD   pravastatin (PRAVACHOL) 10 MG tablet Take 10 mg by mouth daily   Yes Historical Provider, MD   insulin lispro (HUMALOG) 100 UNIT/ML injection vial Inject into the skin 3 times daily (before meals)   Yes Historical Provider, MD   insulin glargine (LANTUS) 100 UNIT/ML injection vial Inject 18 Units into the skin nightly   Yes Historical Provider, MD   diclofenac (VOLTAREN) 0.1 % ophthalmic solution 1 drop 4 times daily   Yes Historical Provider, MD diclofenac sodium (VOLTAREN) 1 % GEL Apply 2 g topically 4 times daily as needed for Pain   Yes Historical Provider, MD   triamcinolone (KENALOG) 0.1 % lotion Apply topically 3 times daily Apply topically 3 times daily. Yes Historical Provider, MD        Allergies:     Cellcept [mycophenolate mofetil]    Social History:     Tobacco:    reports that she quit smoking about 20 years ago. She smoked 1.00 pack per day. She has never used smokeless tobacco.  Alcohol:      reports current alcohol use. Drug Use:  reports no history of drug use. Family History:     History reviewed. No pertinent family history. Review of Systems:     Positive and Negative as described in HPI. CONSTITUTIONAL:  negative for fevers, chills, sweats, fatigue, weight loss  HEENT: Reports neck pain, reports sore throat; negative for vision, hearing changes, runny nose  RESPIRATORY:  negative for shortness of breath, cough, congestion, wheezing. CARDIOVASCULAR:  negative for chest pain, palpitations.   GASTROINTESTINAL:  negative for nausea, vomiting, diarrhea, constipation, change in bowel habits, abdominal pain   GENITOURINARY:  negative for difficulty of urination, burning with urination, frequency   INTEGUMENT:  negative for rash, skin lesions, easy bruising   HEMATOLOGIC/LYMPHATIC:  negative for swelling/edema   ALLERGIC/IMMUNOLOGIC:  negative for urticaria , itching  ENDOCRINE:  negative increase in drinking, increase in urination, hot or cold intolerance  MUSCULOSKELETAL:  negative joint pains, muscle aches, swelling of joints  NEUROLOGICAL:  negative for headaches, dizziness, lightheadedness, numbness, pain, tingling extremities  BEHAVIOR/PSYCH:  negative for depression, anxiety    Physical Exam:     BP (!) 176/95   Pulse 94   Temp 98.8 °F (37.1 °C) (Oral)   Resp 16   Ht 5' 4\" (1.626 m)   Wt 185 lb (83.9 kg)   SpO2 93%   BMI 31.76 kg/m²   Temp (24hrs), Av.9 °F (36.6 °C), Min:92.8 °F (33.8 °C), Max:99.3 °F (37.4 °C)    Recent Labs     11/02/20  0616 11/02/20  1033 11/02/20  1634   POCGLU 159* 209* 247*       Intake/Output Summary (Last 24 hours) at 11/2/2020 1724  Last data filed at 11/2/2020 1231  Gross per 24 hour   Intake 1349 ml   Output 710 ml   Net 639 ml       General Appearance:  alert, well appearing, and in no acute distress  Mental status: oriented to person, place, and time with normal affect  Head:  normocephalic, atraumatic. Eye: no icterus, redness, pupils equal and reactive, extraocular eye movements intact, conjunctiva clear  Ear: normal external ear, no discharge, hearing intact  Nose:  no drainage noted  Mouth: mucous membranes moist  Neck: supple, no carotid bruits, thyroid not palpable, Aspen collar present  Lungs: Bilateral equal air entry, clear to ausculation, no wheezing, rales or rhonchi, normal effort  Cardiovascular: Tachycardia, regular rhythm, no murmur, gallop, rub.   Abdomen: Soft, nontender, nondistended, normal bowel sounds, no hepatomegaly or splenomegaly  Neurologic: There are no new focal motor or sensory deficits, normal muscle tone and bulk, no abnormal sensation, normal speech, cranial nerves II through XII grossly intact  Skin: No gross lesions, rashes, bruising or bleeding on exposed skin area  Extremities:  peripheral pulses palpable, no pedal edema or calf pain with palpation  Psych: normal affect     Investigations:      Laboratory Testing:  Recent Results (from the past 24 hour(s))   POC Glucose Fingerstick    Collection Time: 11/02/20  6:16 AM   Result Value Ref Range    POC Glucose 159 (H) 65 - 105 mg/dL   POC Glucose Fingerstick    Collection Time: 11/02/20 10:33 AM   Result Value Ref Range    POC Glucose 209 (H) 65 - 105 mg/dL   Hemoglobin A1c    Collection Time: 11/02/20 12:37 PM   Result Value Ref Range    Hemoglobin A1C 7.7 (H) 4.0 - 6.0 %    Estimated Avg Glucose 174 mg/dL   POC Glucose Fingerstick    Collection Time: 11/02/20  4:34 PM   Result Value Ref Range    POC Glucose 247 (H) 65 - 105 mg/dL       Imaging/Diagonstics:  No results found. Assessment :      Hospital Problems           Last Modified POA    * (Principal) Cervical spondylosis with radiculopathy (Chronic) 11/2/2020 Yes    Uncontrolled hypertension 11/2/2020 Yes    Controlled type 2 diabetes mellitus without complication, with long-term current use of insulin ([address] 11/2/2020 Yes    Obesity (BMI 30-39.9) 11/2/2020 Yes    JESUS on CPAP 11/2/2020 Yes          Plan:     1. Your continued orthopedic surgery care  2. DVT prophylaxis at your discretion  3. Pain control at your discretion - morphine and Percocet  4. Patient has been hypertensive in the perioperative period and does not appear to be on any sort of antihypertensive medications. I will start losartan this evening for renal protective properties in patients who were diabetic. Labetalol as needed for SBP greater than 170  5. Continue Lantus 18 units nightly with sliding scale insulin for daytime mealtime coverage  6. Continue Lyrica, statin therapy  7. Bowel regimen  8. Hep-Lock IV fluids as patient is hypertensive  9. PT/OT at your discretion  10. Check a.m. labs  11. We will continue to follow. Thank you for allowing us to partake in the care of this patient.     Consultations:   IP CONSULT TO [address] [place] [address]   11/2/2020  5:24 PM    Copy sent to Dr. Jeferson Self MD

## 2020-11-02 NOTE — PROGRESS NOTES
Jose in and aware of the elevated BP meds ordered, pt  Medicated again for complaints of pain of 9, telemetry added for beta blocker administration

## 2020-11-03 VITALS
TEMPERATURE: 98.7 F | SYSTOLIC BLOOD PRESSURE: 145 MMHG | HEIGHT: 64 IN | OXYGEN SATURATION: 96 % | RESPIRATION RATE: 14 BRPM | WEIGHT: 185 LBS | BODY MASS INDEX: 31.58 KG/M2 | HEART RATE: 85 BPM | DIASTOLIC BLOOD PRESSURE: 75 MMHG

## 2020-11-03 LAB
ANION GAP SERPL CALCULATED.3IONS-SCNC: 9 MMOL/L (ref 9–17)
BUN BLDV-MCNC: 19 MG/DL (ref 8–23)
BUN/CREAT BLD: 30 (ref 9–20)
CALCIUM SERPL-MCNC: 9.1 MG/DL (ref 8.6–10.4)
CHLORIDE BLD-SCNC: 98 MMOL/L (ref 98–107)
CO2: 26 MMOL/L (ref 20–31)
CREAT SERPL-MCNC: 0.64 MG/DL (ref 0.5–0.9)
GFR AFRICAN AMERICAN: >60 ML/MIN
GFR NON-AFRICAN AMERICAN: >60 ML/MIN
GFR SERPL CREATININE-BSD FRML MDRD: ABNORMAL ML/MIN/{1.73_M2}
GFR SERPL CREATININE-BSD FRML MDRD: ABNORMAL ML/MIN/{1.73_M2}
GLUCOSE BLD-MCNC: 241 MG/DL (ref 65–105)
GLUCOSE BLD-MCNC: 251 MG/DL (ref 70–99)
GLUCOSE BLD-MCNC: 283 MG/DL (ref 65–105)
HCT VFR BLD CALC: 40.2 % (ref 36.3–47.1)
HEMOGLOBIN: 13.3 G/DL (ref 11.9–15.1)
MCH RBC QN AUTO: 30.7 PG (ref 25.2–33.5)
MCHC RBC AUTO-ENTMCNC: 33.1 G/DL (ref 28.4–34.8)
MCV RBC AUTO: 92.8 FL (ref 82.6–102.9)
NRBC AUTOMATED: 0 PER 100 WBC
PDW BLD-RTO: 13.9 % (ref 11.8–14.4)
PLATELET # BLD: 247 K/UL (ref 138–453)
PMV BLD AUTO: 12.3 FL (ref 8.1–13.5)
POTASSIUM SERPL-SCNC: 4 MMOL/L (ref 3.7–5.3)
RBC # BLD: 4.33 M/UL (ref 3.95–5.11)
SODIUM BLD-SCNC: 133 MMOL/L (ref 135–144)
WBC # BLD: 17.3 K/UL (ref 3.5–11.3)

## 2020-11-03 PROCEDURE — 82947 ASSAY GLUCOSE BLOOD QUANT: CPT

## 2020-11-03 PROCEDURE — 97530 THERAPEUTIC ACTIVITIES: CPT

## 2020-11-03 PROCEDURE — 99232 SBSQ HOSP IP/OBS MODERATE 35: CPT | Performed by: FAMILY MEDICINE

## 2020-11-03 PROCEDURE — 97161 PT EVAL LOW COMPLEX 20 MIN: CPT

## 2020-11-03 PROCEDURE — 2580000003 HC RX 258: Performed by: ORTHOPAEDIC SURGERY

## 2020-11-03 PROCEDURE — 6370000000 HC RX 637 (ALT 250 FOR IP): Performed by: FAMILY MEDICINE

## 2020-11-03 PROCEDURE — 6370000000 HC RX 637 (ALT 250 FOR IP): Performed by: ORTHOPAEDIC SURGERY

## 2020-11-03 PROCEDURE — 80048 BASIC METABOLIC PNL TOTAL CA: CPT

## 2020-11-03 PROCEDURE — 85027 COMPLETE CBC AUTOMATED: CPT

## 2020-11-03 PROCEDURE — 2500000003 HC RX 250 WO HCPCS: Performed by: INTERNAL MEDICINE

## 2020-11-03 PROCEDURE — 97116 GAIT TRAINING THERAPY: CPT

## 2020-11-03 PROCEDURE — 36415 COLL VENOUS BLD VENIPUNCTURE: CPT

## 2020-11-03 RX ORDER — OXYCODONE HYDROCHLORIDE AND ACETAMINOPHEN 5; 325 MG/1; MG/1
1-2 TABLET ORAL EVERY 4 HOURS PRN
Qty: 60 TABLET | Refills: 0 | Status: SHIPPED | OUTPATIENT
Start: 2020-11-03 | End: 2020-11-10

## 2020-11-03 RX ORDER — TIZANIDINE 4 MG/1
4 TABLET ORAL EVERY 8 HOURS PRN
Qty: 50 TABLET | Refills: 0 | Status: SHIPPED | OUTPATIENT
Start: 2020-11-03

## 2020-11-03 RX ORDER — CHLORTHALIDONE 25 MG/1
25 TABLET ORAL DAILY
Status: DISCONTINUED | OUTPATIENT
Start: 2020-11-03 | End: 2020-11-03 | Stop reason: HOSPADM

## 2020-11-03 RX ORDER — SENNA AND DOCUSATE SODIUM 50; 8.6 MG/1; MG/1
1 TABLET, FILM COATED ORAL 2 TIMES DAILY
Qty: 60 TABLET | Refills: 0 | Status: SHIPPED | OUTPATIENT
Start: 2020-11-03

## 2020-11-03 RX ORDER — CHLORTHALIDONE 25 MG/1
25 TABLET ORAL DAILY
Qty: 30 TABLET | Refills: 3 | Status: SHIPPED | OUTPATIENT
Start: 2020-11-04

## 2020-11-03 RX ORDER — LOSARTAN POTASSIUM 100 MG/1
100 TABLET ORAL DAILY
Status: DISCONTINUED | OUTPATIENT
Start: 2020-11-03 | End: 2020-11-03 | Stop reason: HOSPADM

## 2020-11-03 RX ORDER — LOSARTAN POTASSIUM 100 MG/1
100 TABLET ORAL DAILY
Qty: 30 TABLET | Refills: 3 | Status: SHIPPED | OUTPATIENT
Start: 2020-11-03

## 2020-11-03 RX ADMIN — POLYETHYLENE GLYCOL 3350 17 G: 17 POWDER, FOR SOLUTION ORAL at 09:41

## 2020-11-03 RX ADMIN — INSULIN LISPRO 4 UNITS: 100 INJECTION, SOLUTION INTRAVENOUS; SUBCUTANEOUS at 09:45

## 2020-11-03 RX ADMIN — CHLORTHALIDONE 25 MG: 25 TABLET ORAL at 09:41

## 2020-11-03 RX ADMIN — OXYCODONE HYDROCHLORIDE AND ACETAMINOPHEN 2 TABLET: 5; 325 TABLET ORAL at 12:27

## 2020-11-03 RX ADMIN — OXYCODONE HYDROCHLORIDE AND ACETAMINOPHEN 2 TABLET: 5; 325 TABLET ORAL at 04:07

## 2020-11-03 RX ADMIN — PREGABALIN 75 MG: 75 CAPSULE ORAL at 09:41

## 2020-11-03 RX ADMIN — DOCUSATE SODIUM 50MG AND SENNOSIDES 8.6MG 1 TABLET: 8.6; 5 TABLET, FILM COATED ORAL at 09:41

## 2020-11-03 RX ADMIN — Medication 10 ML: at 09:48

## 2020-11-03 RX ADMIN — OXYCODONE HYDROCHLORIDE AND ACETAMINOPHEN 2 TABLET: 5; 325 TABLET ORAL at 08:05

## 2020-11-03 RX ADMIN — LABETALOL HYDROCHLORIDE 20 MG: 5 INJECTION INTRAVENOUS at 04:07

## 2020-11-03 RX ADMIN — LOSARTAN POTASSIUM 100 MG: 100 TABLET, FILM COATED ORAL at 09:45

## 2020-11-03 RX ADMIN — INSULIN LISPRO 6 UNITS: 100 INJECTION, SOLUTION INTRAVENOUS; SUBCUTANEOUS at 11:51

## 2020-11-03 ASSESSMENT — PAIN DESCRIPTION - LOCATION
LOCATION: NECK

## 2020-11-03 ASSESSMENT — PAIN SCALES - GENERAL
PAINLEVEL_OUTOF10: 7
PAINLEVEL_OUTOF10: 7
PAINLEVEL_OUTOF10: 3
PAINLEVEL_OUTOF10: 7

## 2020-11-03 ASSESSMENT — PAIN DESCRIPTION - DESCRIPTORS
DESCRIPTORS: SORE
DESCRIPTORS: ACHING;CONSTANT;DISCOMFORT

## 2020-11-03 ASSESSMENT — PAIN DESCRIPTION - ONSET
ONSET: ON-GOING
ONSET: GRADUAL

## 2020-11-03 ASSESSMENT — PAIN DESCRIPTION - ORIENTATION: ORIENTATION: ANTERIOR

## 2020-11-03 ASSESSMENT — PAIN DESCRIPTION - FREQUENCY
FREQUENCY: CONTINUOUS
FREQUENCY: CONTINUOUS

## 2020-11-03 ASSESSMENT — PAIN DESCRIPTION - PAIN TYPE
TYPE: SURGICAL PAIN

## 2020-11-03 ASSESSMENT — PAIN DESCRIPTION - PROGRESSION: CLINICAL_PROGRESSION: NOT CHANGED

## 2020-11-03 NOTE — PROGRESS NOTES
Physical Therapy    Facility/Department: [facility] MED SURG  Initial Assessment    NAME: Magnolia Pink  : 1960  MRN: [id number]    Date of Service: 11/3/2020    Discharge Recommendations:  Home with assist PRN     Pt presented to surgery on 20 for:    1. C4-5 anterior cervical diskectomy and fusion. 2. C5-6 anterior cervical diskectomy and fusion. 3. Insertion of interbody cages at C4-5 and C5-6  4. Insertion of anterior cervical plate and screws from C4-6,      which is a 2-level instrumentation utilizing Medtronic Zevo plate and screws. 5. [place] of local bone for spinal fusion. 6. Use of allograft bone for spinal fusion to include Osteocel secondary  C4-6 cervical spondylotic radiculopathy    RN reports patient is medically stable for therapy treatment this date. Chart reviewed prior to treatment and patient is agreeable for therapy. Assessment   Body structures, Functions, Activity limitations: Decreased functional mobility   Assessment: Pt tolerated session well &by end of visit, pt demonstrating safe functional mobility, gait & steps. Pt Ed on spinal precautions, safety & energy principles, prevention of sedentary complications & home walking program. Pt issued written pt education.   Pt is safe to D/C home & progress to OP PT for reconditioing when appropriate by Dr Simon Martínez  Prognosis: Excellent  Decision Making: Low Complexity  Exam: ROM, MMT, functional mobility, activity tolerance, Balance, & [employer] AM-PAC 6 Clicks Basic Mobility  Clinical Presentation: stable  PT Education: PT Role;Transfer Training;Gait Training;Precautions  Patient Education: Ed safe functional mobility, spinal precautions, safety & energy principles, prevention of sedentary complications & home walking program, & issued written pt education;  REQUIRES PT FOLLOW UP: No  Activity Tolerance  Activity Tolerance: Patient Tolerated treatment well       Patient Diagnosis(es): The encounter diagnosis was Cervical spondylosis with radiculopathy. has a past medical history of Arthritis, Asthma, Cancer ([facility] [address] COPD (chronic obstructive pulmonary disease) ([facility] [address] Depression, Diabetes mellitus ([facility] [address] Hyperlipidemia, Hypertension, JESUS on CPAP, Renal stone, and Sleep apnea. has a past surgical history that includes Cholecystectomy; Colonoscopy; Endoscopy, colon, diagnostic; skin biopsy; Knee arthroscopy (Right); knee surgery (Left); Pancreas surgery; Splenectomy; Nasal septum surgery; shoulder surgery (Bilateral); Breast enhancement surgery (Bilateral); Abdominoplasty; and cervical fusion (N/A, 11/2/2020).     Restrictions  Restrictions/Precautions  Restrictions/Precautions: General Precautions, Surgical Protocols, Fall Risk  Required Braces or Orthoses?: Yes  Required Braces or Orthoses  Cervical: c-collar  Position Activity Restriction  Spinal Precautions: No Bending, No Lifting, No Twisting  Other position/activity restrictions: ambulate, activity as tolerated, pt to wear cervical collar, dysphagia precautions  Vision/Hearing  Vision: Impaired  Vision Exceptions: Wears glasses for reading     Subjective  General  Chart Reviewed: Yes  Patient assessed for rehabilitation services?: Yes  Additional Pertinent Hx: arthritis, COPD, DM2, HTN, JESUS with use cpap  Response To Previous Treatment: Not applicable  General Comment  Comments: RN okays PT  Subjective  Subjective: Pt agreeable to PT  Pain Screening  Patient Currently in Pain: Yes  Pain Assessment  Pain Type: Surgical pain  Pain Location: Neck  Vital Signs  Patient Currently in Pain: Yes       Orientation  Orientation  Overall Orientation Status: Within Normal Limits  Social/Functional History  Social/Functional History  Lives With: Spouse  Type of Home: House  Home Layout: Two level, Able to Live on Main level with bedroom/bathroom  Home Access: Stairs to enter with rails  Entrance Stairs - Number of Steps: 5  Entrance Stairs - Rails: Both  Bathroom Shower/Tub: Tub/Shower unit  Bathroom Toilet: Standard  Home Equipment: Rolling walker, Cane  ADL Assistance: Independent  Homemaking Assistance: Independent  Homemaking Responsibilities: Yes  Ambulation Assistance: Independent  Transfer Assistance: Independent  Active : Yes  Mode of Transportation: Car  Occupation: Part time employment  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  Additional Comments: no falls  Cognition   Cognition  Overall Cognitive Status: WFL    Objective     Observation/Palpation  Posture: Fair  Observation: resting in bed in R sidelying position, wearing cervical collar  Scar: L TKA scar    AROM RLE (degrees)  RLE AROM: WFL  AROM LLE (degrees)  LLE AROM : WFL  AROM RUE (degrees)  RUE AROM : WFL  AROM LUE (degrees)  LUE AROM : WFL  Strength RLE  Strength RLE: WFL  Strength LLE  Strength LLE: WFL  Strength RUE  Strength RUE: WFL  Comment: deferred shoulder  Strength LUE  Strength LUE: WFL  Comment: deferred shoulder  Tone RLE  RLE Tone: Normotonic  Tone LLE  LLE Tone: Normotonic  Coordination  Movements Are Fluid And Coordinated: Yes  Motor Control  Gross Motor?: WFL  Sensation  Overall Sensation Status: WFL  Bed mobility  Rolling to Left: Independent  Rolling to Right: Independent  Supine to Sit: Supervision  Sit to Supine: Supervision  Scooting: Supervision  Comment: Min verbal instruction/tactile assist for UE hand placement on rail and proper technique  Transfers  Sit to Stand: Stand by assistance  Stand to sit: Stand by assistance  Bed to Chair: Stand by assistance  Stand Pivot Transfers: Stand by assistance  Lateral Transfers: Stand by assistance  Comment: Ed on correct use of upper body for safe sit/stand  Ambulation  Ambulation?: Yes  More Ambulation?: Yes  Ambulation 1  Surface: level tile  Device: No Device  Quality of Gait: step to pattern, slow & guarded  Distance: 150ft  Ambulation 2  Surface - 2: level tile  Device 2: No device  Assistance 2:  Independent  Quality of Gait 2: step through pattern, slow & guarded but good stability  Distance: 400ft  Stairs/Curb  Stairs?: Yes  Stairs  # Steps : 5  Stairs Height: 6\"  Rails: Bilateral  Device: No Device  Assistance: Stand by assistance     Balance  Sitting - Static: Good  Sitting - Dynamic: Good  Standing - Static: Good  Standing - Dynamic: Good  Exercises  Comments: Ed spinal precautions, posture, energy conservation & safety principles, prevention sedentary complications & home walking program     Plan   Plan  Times per week: this visit for functional mobility, transfers, gait & stairs, & pt education  Safety Devices  Type of devices: Call light within reach, Gait belt, Patient at risk for falls, Left in bed, Nurse notified    G-Code       OutComes Score                                                  AM-PAC Score  AM-PAC Inpatient Mobility Raw Score : 24 (11/03/20 0832)  AM-PAC Inpatient T-Scale Score : 61.14 (11/03/20 1590)  Mobility Inpatient CMS 0-100% Score: 0 (11/03/20 0832)  Mobility Inpatient CMS G-Code Modifier : [facility] (11/03/20 5162)          Goals  Short term goals  Time Frame for Short term goals: 2 visits  Short term goal 1: Pt will demonstrate independent functional mobility including gait of at least 300ft & up/down 5 steps with one rail indep  Short term goal 2: Ed spinal precautions, posture, energy conservation & safety principles, prevention sedentary complications & home walking program, & issue written pt education       Therapy Time   Individual Concurrent Group Co-treatment   Time In 0750         Time Out 0832         Minutes 42+10=52              Additional 10 minutes for chart review          [address], PT

## 2020-11-03 NOTE — OP NOTE
11/02/20 1801   Drainage Appearance Bloody 11/02/20 1801   Status To bulb suction 11/02/20 1408   Output (ml) 10 ml 11/02/20 1734       [REMOVED] Urethral Catheter Double-lumen;Non-latex;Straight-tip 16 fr (Removed)       Detailed Description of Procedure:     INDICATIONS:  This is a pleasant 56year-old female with a  longstanding history of significant neck pain as well as pain  radiating to the bilateral shoulders and arms with the right side being  worse than the left. She had done extensive conservative  management to include physical therapy, medications, pain  management, all with minimal long-standing benefit. MRI and x-  rays were performed, which did show severe disk degeneration at  C4-6 as well as significant foraminal stenosis at both the C4-5  and C5-6 levels. Due to the continued pain and symptoms, it was  discussed with her the option of performing a two-level  decompression fusion at C4-6. Risks were discussed including  bleeding, infection, injury to nerves, vessels, anesthetic risk,  need for possible further future surgery as well as the  possibility for continued pain, continued symptoms, and possible  nonunion. She did understand all these risks, did wish to  proceed. Informed consent was obtained. DESCRIPTION OF PROCEDURE:  The patient was taken to the operating  room, placed supine on the operating table. She was intubated,  placed under general anesthesia by the anesthesiologist.  She was  given preoperative antibiotic prophylaxis. Shoulder roll was placed, arms were tucked at the sides,  and shoulders were taped down. At this point, a 3 cm incision  was made over the left side of her neck at the C5 region. Dissection was carried down to the subcutaneous tissue and  through the platysma. Platysma was then undermined with  Metzenbaum scissors. Medial border of sternocleidomastoid was  visualized and the deep cervical fascia was bluntly opened.   At  this point, blunt finger dissection was then used to proceed  medial to the carotid sheath and lateral to the esophagus and  trachea down to the anterior cervical spine. The handheld  Cloward was used to expose the prevertebral fascia. Two Kittners  were used to dissect the prevertebral fascia bluntly and expose  the anterior longitudinal ligament. Once this was done, a needle  was then inserted at the presumed C5-C6 level and C-arm was  brought in and confirmed it to be at the C5-C6 level. At this  point, the longus colli was dissected free bilaterally from C4-6. The disk space was marked with the Bovie and the needle was  removed. The Trimline retractor was inserted at the C5-6 level  and exposed the C5-6 interspace. Anterior osteophytes were  removed and this bone was harvested for later use. The disk  space was then incised with a scalpel. Pituitary was used to  partially remove the disk material followed by placement of  distractor pins at C5-6 and distraction was performed at this  level. At this point, pituitary and curettes were used to remove  the remaining disk material back to the posterior longitudinal  ligament as well as the cartilaginous endplates. The posterior  osteophytes were then burred with the bur as well as the  uncinates and the curettes were then used to remove posterior  osteophytes as well as a #1 [place] to perform bilateral  foraminotomies until there was full decompression at the C5-6  level. Once this was done, sizing was performed and showed that  a 8 mm spacer cage would be appropriate. This was then packed  with the Osteocel allograft bone as well as the local bone, which  was previously harvested and it was then TESSY NOVOA [facility] into the C5-6  disk space until it was fully seated.  was then removed. The distraction pins were removed and the retractor was then  moved up to the C4-5 level in the exact same fashion.   Full  decompression and disc removal as well as cartilaginous endplates  were removed as well. Posterior decompression was complete as  well as foraminotomies until it was fully decompressed. A size 8  spacer was chosen and packed with the same bone graft, which was  the Osteocel graft and local bone, and was malleted into the  position until it was fully seated at the C4-5 level. Once both  cages were in good position and distractor pins were removed,  head was flexed up slightly to allow compression at the C4-6  levels. The anterior plate was chosen and  placed  anterior along the cervical spine. The starting holes were then  created with an awl and a total of 6 screws were placed, 2 at C4,  2 at C5, and 2 at C6 for securing the plate to the anterior  cervical spine. All locking mechanisms were then engaged. Wound  was then irrigated with sterile normal saline followed by removal  of the Trimline retractor. C-arm was then brought in and final  pictures were taken, which showed all instrumentation from C4-6  to be in excellent position on both AP and lateral views. Wound  was then reinspected with a hand-held Cloward. Hemostasis was  achieved with Gelfoam, thrombin as well as full FloSeal.  Wound  was once again irrigated with sterile normal saline with  bacitracin. HOMAR drain was placed followed by closure with a 3-0  Vicryl and a running 4-0 Monocryl suture with Dermabond glue. Standard dressings were then applied followed by placement of a  cervical collar. She was then awoken from anesthesia,  extubated,   and taken to recovery room in stable condition.         Electronically signed by Jacinda Kirby MD on 11/2/2020 at 9:40 PM

## 2020-11-03 NOTE — PLAN OF CARE
Problem: Discharge Planning:  Goal: Discharged to appropriate level of care  Description: Discharged to appropriate level of care  11/3/2020 0104 by Bethel Leyva RN  Outcome: Ongoing     Problem: Serum Glucose Level - Abnormal:  Goal: Ability to maintain appropriate glucose levels will improve  Description: Ability to maintain appropriate glucose levels will improve  11/3/2020 0104 by Bethel Leyva RN  Outcome: Ongoing     Problem: Sensory Perception - Impaired:  Goal: Ability to maintain a stable neurologic state will improve  Description: Ability to maintain a stable neurologic state will improve  11/3/2020 0104 by Bethel Leyva RN  Outcome: Ongoing     Problem: Sensory Perception - Impaired:  Goal: Absence of restraint-related injury  Description: Sensory function intact, lower extremity  Outcome: Ongoing     Problem: Sensory Perception - Impaired:  Goal: Ability to demonstrate correct body alignment while lying, sitting, and standing will improve  Description: Ability to demonstrate correct body alignment while lying, sitting, and standing will improve  Outcome: Ongoing     Problem: Sensory Perception - Impaired:  Goal: Circulatory function of lower extremities is within specified parameters  Description: Circulatory function of lower extremities is within specified parameters  Outcome: Ongoing     Problem: Falls - Risk of:  Goal: Will remain free from falls  Description: Will remain free from falls  11/3/2020 0104 by Bethel Leyva RN  Outcome: Ongoing     Problem: Falls - Risk of:  Goal: Absence of physical injury  Description: Absence of physical injury  Outcome: Ongoing    Siderails up x 2  Hourly rounding  Call light in reach  Instructed to call for assist before attempting out of bed.   Remains free from falls and accidental injury at this time   Floor free from obstacles  Bed is locked and in lowest position  Adequate lighting provided  Bed alarm on, [place] star and Stay with Me signs posted

## 2020-11-03 NOTE — PROGRESS NOTES
[facility] Ortho Spine  Attending Progress Note  11/3/2020  9:19 AM     Marylen Serge    1960   [id number]      SUBJECTIVE:  Doing well now. Swallowing improved. Has been up walking well. Voiding well. Pain controlled. She has hypertension that medicine is treating now. She will need to follow with her PCP regarding this. OBJECTIVE      Physical      VITALS:  BP (!) 170/81   Pulse 84   Temp 98.2 °F (36.8 °C) (Oral)   Resp 15   Ht 5' 4\" (1.626 m)   Wt 185 lb (83.9 kg)   SpO2 94%   BMI 31.76 kg/m²     Dressing C/D/I    NEUROLOGIC: Alert and Oriented x 3. Strength 5/5 HF, 5/5 Q, 5/5 TA, 5/5 EHL, 5/5 GS. 5/5 D, 5/5 B, 5/5 T, 5/5 WE, 5/5 WF, 5/5 I                                                                  Sensation intact.      Data  CBC:   Lab Results   Component Value Date    WBC 17.3 11/03/2020    RBC 4.33 11/03/2020    HGB 13.3 11/03/2020    HCT 40.2 11/03/2020    MCV 92.8 11/03/2020    MCH 30.7 11/03/2020    MCHC 33.1 11/03/2020    RDW 13.9 11/03/2020     11/03/2020    MPV 12.3 11/03/2020     BMP:    Lab Results   Component Value Date     11/03/2020    K 4.0 11/03/2020    CL 98 11/03/2020    CO2 26 11/03/2020    BUN 19 11/03/2020    CREATININE 0.64 11/03/2020    CALCIUM 9.1 11/03/2020    GFRAA >60 11/03/2020    LABGLOM >60 11/03/2020    GLUCOSE 251 11/03/2020           Current Inpatient Medications    Current Facility-Administered Medications: losartan (COZAAR) tablet 100 mg, 100 mg, Oral, Daily  chlorthalidone (HYGROTON) tablet 25 mg, 25 mg, Oral, Daily  estrogen (conjugated)-medroxyPROGESTERone (PREMPRO) 0.625-2.5 MG per tablet 1 tablet, 1 tablet, Oral, Daily  insulin glargine (LANTUS) injection vial 18 Units, 18 Units, Subcutaneous, Nightly  pravastatin (PRAVACHOL) tablet 10 mg, 10 mg, Oral, Daily  pregabalin (LYRICA) capsule 75 mg, 75 mg, Oral, BID  sodium chloride flush 0.9 % injection 10 mL, 10 mL, Intravenous, 2 times per day  sodium chloride flush 0.9 % injection 10 mL, 10 mL, Intravenous, PRN  morphine (PF) injection 2 mg, 2 mg, Intravenous, Q2H PRN  glucose (GLUTOSE) 40 % oral gel 15 g, 15 g, Oral, PRN  dextrose 50 % IV solution, 12.5 g, Intravenous, PRN  glucagon (rDNA) injection 1 mg, 1 mg, Intramuscular, PRN  dextrose 5 % solution, 100 mL/hr, Intravenous, PRN  polyethylene glycol (GLYCOLAX) packet 17 g, 17 g, Oral, Daily  sennosides-docusate sodium (SENOKOT-S) 8.6-50 MG tablet 1 tablet, 1 tablet, Oral, BID  promethazine (PHENERGAN) tablet 12.5 mg, 12.5 mg, Oral, Q6H PRN **OR** ondansetron (ZOFRAN) injection 4 mg, 4 mg, Intravenous, Q6H PRN  insulin lispro (HUMALOG) injection vial 0-12 Units, 0-12 Units, Subcutaneous, TID WC  insulin lispro (HUMALOG) injection vial 0-6 Units, 0-6 Units, Subcutaneous, Nightly  tiZANidine (ZANAFLEX) tablet 4 mg, 4 mg, Oral, Q8H PRN  oxyCODONE-acetaminophen (PERCOCET) 5-325 MG per tablet 1 tablet, 1 tablet, Oral, Q4H PRN **OR** oxyCODONE-acetaminophen (PERCOCET) 5-325 MG per tablet 2 tablet, 2 tablet, Oral, Q4H PRN  labetalol (NORMODYNE;TRANDATE) injection 20 mg, 20 mg, Intravenous, Q4H PRN    ASSESSMENT AND PLAN    56 y.o. female status post C4-6 ACDF post op day #  1,  Uncontrolled hypertension -  Medicine treating     1. PT- WBAT  2. Pain control  3. EPC  4. D/C plan for home later today if ok with medicine. She will need to follow with her PCP for BP issue.     Aleyda Ortega MD  [facility]  Spine Surgeon  [phone]

## 2020-11-03 NOTE — PROGRESS NOTES
[facility]  Office: [address], DO, Hudson Kenyon, DO, Dhaval Anmol, DO, [place] Blood, DO, Huey Osborn MD, Alberto Mcwilliams MD, Sallie Walker MD, Albin Anthony MD, Anna Null MD, Dave Sim MD, Layla Barlow MD, Hillary Ramires MD, Enrrique Sun MD, Yazan Locke DO, Cathie Patiño MD, Lucio Holden MD, Irene West, DO, Bobbi Cohen MD,  Marisol Nance DO, Skylar Guardado MD, Srinath Tijerina MD, Jada Pfeiffer, [facility], [facility], CNP, Brenda Beltre, CNP, Nahid Segundo, CNS, Eliecer Chavez, CNP, Gerilyn Cockayne, CNP, Avinash Aguilar, CNP, Lilia Olivera, CNP, Camillo Schaumann, CNP, Wayne Vela PA-C, Carlos A Osborn, [facility], [place], CNP, Richi Turcios, CNP, Teagan Arguello, CNP, Rubi Mckeon, CNP, Anjum Vincent, [facility]   [address] [facility]    Progress Note    11/3/2020    10:57 AM    Name:   Cathy Johns  MRN:     [id number]     [place]:      [de-identified]   Room:   2016/2016-02   Day:  0  Admit Date:  11/2/2020  5:40 AM    PCP:   Jamie Rodriguez MD  Code Status:  Full Code    Subjective:     C/C: Neck pain     Interval History Status: improved. Pt was seen and examined          Review of Systems:     12 point ROS performed today and negative     Medications: Allergies:     Allergies   Allergen Reactions    Cellcept [Mycophenolate Mofetil] Itching       Current Meds:   Scheduled Meds:    losartan  100 mg Oral Daily    chlorthalidone  25 mg Oral Daily    estrogen (conjugated)-medroxyPROGESTERone  1 tablet Oral Daily    insulin glargine  18 Units Subcutaneous Nightly    pravastatin  10 mg Oral Daily    pregabalin  75 mg Oral BID    sodium chloride flush  10 mL Intravenous 2 times per day    polyethylene glycol  17 g Oral Daily    sennosides-docusate sodium  1 tablet Oral BID    insulin lispro  0-12 Units Subcutaneous TID WC    insulin lispro  0-6 Units Subcutaneous Nightly     Continuous Infusions:    dextrose       PRN Meds: sodium chloride flush, morphine, glucose, dextrose, glucagon (rDNA), dextrose, promethazine **OR** ondansetron, tiZANidine, oxyCODONE-acetaminophen **OR** oxyCODONE-acetaminophen, labetalol    Data:     Past Medical History:   has a past medical history of Arthritis, Asthma, Cancer ([facility] [address] COPD (chronic obstructive pulmonary disease) ([facility] [address] Depression, Diabetes mellitus ([facility] Hyperlipidemia, Hypertension, JESUS on CPAP, Renal stone, and Sleep apnea. Social History:   reports that she quit smoking about 20 years ago. She smoked 1.00 pack per day. She has never used smokeless tobacco. She reports current alcohol use. She reports that she does not use drugs. Family History: History reviewed. No pertinent family history. Vitals:  BP (!) 170/81   Pulse 84   Temp 98.2 °F (36.8 °C) (Oral)   Resp 15   Ht 5' 4\" (1.626 m)   Wt 185 lb (83.9 kg)   SpO2 94%   BMI 31.76 kg/m²   Temp (24hrs), Av.2 °F (36.8 °C), Min:97 °F (36.1 °C), Max:98.8 °F (37.1 °C)    Recent Labs     20  1033 20  1634 20  1952 20  0616   POCGLU 209* 247* 277* 241*       I/O (24Hr):     Intake/Output Summary (Last 24 hours) at 11/3/2020 1057  Last data filed at 11/3/2020 0811  Gross per 24 hour   Intake 1182 ml   Output 1700 ml   Net -518 ml       Labs:  Hematology:  Recent Labs     20  0526   WBC 17.3*   RBC 4.33   HGB 13.3   HCT 40.2   MCV 92.8   MCH 30.7   MCHC 33.1   RDW 13.9      MPV 12.3     Chemistry:  Recent Labs     20  0526   *   K 4.0   CL 98   CO2 26   GLUCOSE 251*   BUN 19   CREATININE 0.64   ANIONGAP 9   LABGLOM >60   GFRAA >60   CALCIUM 9.1     Recent Labs     20  0616 20  1033 20  1237 20  1634 20  1952 20  0616   LABA1C  --   --  7.7*  --   --   --    POCGLU 159* 209*  --  247* 277* 241*     ABG:No results found for: POCPH, PHART, PH, POCPCO2, AMH4EYV, PCO2, POCPO2, PO2ART, PO2, POCHCO3, CKQ6JUU, HCO3, NBEA, PBEA, BEART, BE, THGBART, THB, UYP6BMJ, NMFQ9NOJ, H7GFETYT, O2SAT, FIO2  Lab Results   Component Value Date/Time    SPECIAL NOT REPORTED 10/13/2020 05:01 PM     Lab Results   Component Value Date/Time    CULTURE NO SIGNIFICANT GROWTH 10/13/2020 02:00 PM       Radiology:  No results found. Physical Examination:        General appearance:  alert, cooperative and no distress  Mental Status:  oriented to person, place and time and normal affect  Lungs:  clear to auscultation bilaterally, normal effort  Heart:  regular rate and rhythm, no murmur  Abdomen:  soft, nontender, nondistended, normal bowel sounds  Extremities:  no edema, redness, tenderness in the calves  Skin:  no gross lesions, rashes, induration    Assessment:        Hospital Problems           Last Modified POA    * (Principal) Cervical spondylosis with radiculopathy (Chronic) 11/2/2020 Yes    Controlled type 2 diabetes mellitus without complication, with long-term current use of insulin ([facility] [address] 11/2/2020 Yes    Obesity (BMI 30-39.9) 11/2/2020 Yes    Uncontrolled hypertension 11/2/2020 Yes    JESUS on CPAP 11/2/2020 Yes          Plan:        1.  Uncontrolled HTN   - pts b/p remains elevated despite the addition of losartan to medication regimen   - will increase losartan dose to 100 mg daily and add chlorthalidone 25 mg daily at this time  - pt is cleared for d/c from medical standpoint   - will require close follow up with PCP regarding blood pressure management     Norris Holter, MD  11/3/2020  10:57 AM

## 2020-11-03 NOTE — PROGRESS NOTES
Writer reviewed discharge instructions. Patient verbalize understanding. Signature obtained. Patient discharge with dressings, belongings, hibiclens, scripts for Losartan, percocet, Zanaflex, hygroton.

## 2020-11-03 NOTE — PLAN OF CARE
Problem: Falls - Risk of:  Goal: Will remain free from falls  Description: Will remain free from falls  11/3/2020 0104 by Irena Thao RN  Outcome: Ongoing     Problem: Sensory Perception - Impaired:  Goal: Ability to demonstrate correct body alignment while lying, sitting, and standing will improve  Description: Ability to demonstrate correct body alignment while lying, sitting, and standing will improve  11/3/2020 0104 by Irena Thao RN  Outcome: Ongoing     Problem: Serum Glucose Level - Abnormal:  Goal: Ability to maintain appropriate glucose levels will improve  Description: Ability to maintain appropriate glucose levels will improve  11/3/2020 1213 by Mehdi Benton RN  Outcome: Ongoing  11/3/2020 0104 by Irena Thao RN  Outcome: Ongoing

## 2020-11-03 NOTE — DISCHARGE INSTR - DIET

## 2024-06-14 RX ORDER — FLUTICASONE PROPIONATE 50 MCG
1 SPRAY, SUSPENSION (ML) NASAL DAILY
COMMUNITY

## 2024-06-14 RX ORDER — AMLODIPINE BESYLATE 10 MG/1
10 TABLET ORAL DAILY
COMMUNITY

## 2024-06-14 RX ORDER — CETIRIZINE HYDROCHLORIDE 10 MG/1
10 TABLET ORAL PRN
COMMUNITY

## 2024-06-14 RX ORDER — ALBUTEROL SULFATE 90 UG/1
INHALANT RESPIRATORY (INHALATION) EVERY 4 HOURS PRN
COMMUNITY

## 2024-06-14 RX ORDER — SEMAGLUTIDE 2.68 MG/ML
2 INJECTION, SOLUTION SUBCUTANEOUS
COMMUNITY

## 2024-06-14 RX ORDER — ETODOLAC 400 MG/1
400 TABLET, FILM COATED ORAL 2 TIMES DAILY
COMMUNITY

## 2024-06-14 RX ORDER — BUDESONIDE AND FORMOTEROL FUMARATE DIHYDRATE 160; 4.5 UG/1; UG/1
2 AEROSOL RESPIRATORY (INHALATION) 2 TIMES DAILY
COMMUNITY

## 2024-06-14 RX ORDER — INSULIN DEGLUDEC 100 U/ML
46 INJECTION, SOLUTION SUBCUTANEOUS DAILY
COMMUNITY

## 2024-06-14 RX ORDER — DULOXETIN HYDROCHLORIDE 30 MG/1
30 CAPSULE, DELAYED RELEASE ORAL 2 TIMES DAILY
COMMUNITY

## 2024-06-17 ENCOUNTER — HOSPITAL ENCOUNTER (OUTPATIENT)
Age: 64
Setting detail: OUTPATIENT SURGERY
Discharge: HOME | End: 2024-06-17
Attending: INTERNAL MEDICINE | Admitting: INTERNAL MEDICINE
Payer: COMMERCIAL

## 2024-06-17 VITALS
HEART RATE: 70 BPM | RESPIRATION RATE: 16 BRPM | BODY MASS INDEX: 32.1 KG/M2 | WEIGHT: 188 LBS | SYSTOLIC BLOOD PRESSURE: 112 MMHG | TEMPERATURE: 98.6 F | OXYGEN SATURATION: 94 % | DIASTOLIC BLOOD PRESSURE: 53 MMHG | HEIGHT: 64 IN

## 2024-06-17 DIAGNOSIS — I48.91 ATRIAL FIBRILLATION, UNSPECIFIED TYPE (HCC): ICD-10-CM

## 2024-06-17 DIAGNOSIS — I48.0 PAROXYSMAL ATRIAL FIBRILLATION (HCC): Primary | ICD-10-CM

## 2024-06-17 LAB — ECHO BSA: 1.96 M2

## 2024-06-17 PROCEDURE — 2709999900 HC NON-CHARGEABLE SUPPLY: Performed by: INTERNAL MEDICINE

## 2024-06-17 PROCEDURE — 7100000010 HC PHASE II RECOVERY - FIRST 15 MIN: Performed by: INTERNAL MEDICINE

## 2024-06-17 PROCEDURE — 33285 INSJ SUBQ CAR RHYTHM MNTR: CPT | Performed by: INTERNAL MEDICINE

## 2024-06-17 PROCEDURE — C1764 EVENT RECORDER, CARDIAC: HCPCS | Performed by: INTERNAL MEDICINE

## 2024-06-17 PROCEDURE — 2500000003 HC RX 250 WO HCPCS: Performed by: INTERNAL MEDICINE

## 2024-06-17 PROCEDURE — 6360000002 HC RX W HCPCS: Performed by: INTERNAL MEDICINE

## 2024-06-17 PROCEDURE — C1892 INTRO/SHEATH,FIXED,PEEL-AWAY: HCPCS | Performed by: INTERNAL MEDICINE

## 2024-06-17 DEVICE — MONITOR CRD 1.4 CC 3.4 GM INSERTABLE LINQ: Type: IMPLANTABLE DEVICE | Status: FUNCTIONAL

## 2024-06-17 RX ORDER — BUPIVACAINE HYDROCHLORIDE 5 MG/ML
INJECTION, SOLUTION EPIDURAL; INTRACAUDAL PRN
Status: DISCONTINUED | OUTPATIENT
Start: 2024-06-17 | End: 2024-06-17 | Stop reason: HOSPADM

## 2024-06-17 ASSESSMENT — PAIN - FUNCTIONAL ASSESSMENT
PAIN_FUNCTIONAL_ASSESSMENT: NONE - DENIES PAIN
PAIN_FUNCTIONAL_ASSESSMENT: 0-10

## 2024-06-17 NOTE — INTERVAL H&P NOTE
Update History & Physical    The patient's History and Physical was reviewed with the patient and I examined the patient. There was no change. The surgical site was confirmed by the patient and me.     Plan: The risks, benefits, expected outcome, and alternative to the recommended procedure have been discussed with the patient. Patient understands and wants to proceed with the procedure.     Electronically signed by Cipriano Richard MD on 6/17/2024 at 8:54 AM

## 2024-06-17 NOTE — DISCHARGE INSTRUCTIONS
Discontinue apixaban  The patient may remove the dressing tomorrow.  The patient may shower in 1-2 days.  No wound check is required.      Follow-up with [facility] ([facility]) in 3-4 months    [facility]  [address] [place] [address] [phone]

## 2024-06-17 NOTE — PROCEDURES
PROCEDURE: CARDIAC LOOP RECORDER IMPLANTATION    OPERATION PERFORMED: Implantation of a [employer] LINQ II (LNQ22) cardiac loop recorder with serial number [id number] at the anterior chest wall.    ATTENDING PROVIDER: Cipriano Richard MD    ANESTHESIA: local anesthesia with lidocaine    ESTIMATED BLOOD LOSS: minimal (less than 5 cc)    COMPLICATIONS: None.    TIME OUT: Time out was completed with verification of the correct patient identity, procedure to be performed, procedure site and implanted equipment.    CONSENT: The risks, benefits, approach and potential complications of the procedure were discussed.  The patient was able to give written informed consent after revisiting the key portions of the risk versus benefit profile of the procedure.  See previous notes for additional details.  This detailed informed consent process utilized mutual and shared decision making process between the physician, patient, and potentially patient's selected family and friends.    INDICATION FOR PROCEDURE: Briefly, the patient is cristina Matos is a 64 y.o. year old female with a history of hypertension, postoperative diabetes, neuroendocrine tumor removal 2019, questionable history of nonsustained atrial fibrillation with 22nd event recorded on 30-day event monitor 1 year ago.  She presents for loop recorder implant for ongoing management of atrial fibrillation.  The patient presents for an implantatble loop recorder.    PROCEDURE AND FINDINGS: The patient was brought to the electrophysiology suite.  Informed consent was obtained prior to the procedure and confirmed.  The left anterior chest was prepped and draped in the usual sterile fashion.  Adequate local anesthesia was given at the site of implantation with 1% lidocaine.    A skin incision was made with the trocar, and the loop recorder was implanted into the subcutaneous space using the supplied implant tools.  The wound was closed with surgical adhesive and a sterile

## 2024-06-17 NOTE — PRE SEDATION
Sedation Plan  ASA: class 2 - patient with mild systemic disease     Mallampati class: II - soft palate, uvula, fauces visible.    Sedation plan: local anesthesia    Risks, benefits, and alternatives discussed with patient.  Use of blood products discussed with patient who consented to blood products.

## 2024-07-05 ENCOUNTER — OFFICE VISIT (OUTPATIENT)
Dept: PRIMARY CARE CLINIC | Age: 64
End: 2024-07-05
Payer: COMMERCIAL

## 2024-07-05 ENCOUNTER — HOSPITAL ENCOUNTER (OUTPATIENT)
Age: 64
Setting detail: SPECIMEN
Discharge: HOME | End: 2024-07-05

## 2024-07-05 VITALS
TEMPERATURE: 97.5 F | DIASTOLIC BLOOD PRESSURE: 64 MMHG | HEART RATE: 71 BPM | WEIGHT: 190 LBS | BODY MASS INDEX: 32.61 KG/M2 | SYSTOLIC BLOOD PRESSURE: 122 MMHG

## 2024-07-05 DIAGNOSIS — M54.50 ACUTE LOW BACK PAIN, UNSPECIFIED BACK PAIN LATERALITY, UNSPECIFIED WHETHER SCIATICA PRESENT: ICD-10-CM

## 2024-07-05 DIAGNOSIS — M54.50 ACUTE LOW BACK PAIN, UNSPECIFIED BACK PAIN LATERALITY, UNSPECIFIED WHETHER SCIATICA PRESENT: Primary | ICD-10-CM

## 2024-07-05 LAB
BILIRUBIN, POC: NEGATIVE
BLOOD URINE, POC: NEGATIVE
CLARITY, POC: NORMAL
COLOR, POC: NORMAL
GLUCOSE URINE, POC: NEGATIVE
KETONES, POC: POSITIVE
LEUKOCYTE EST, POC: NEGATIVE
NITRITE, POC: NEGATIVE
PH, POC: 6.5
PROTEIN, POC: NEGATIVE
SPECIFIC GRAVITY, POC: 1.02
UROBILINOGEN, POC: 0.2

## 2024-07-05 PROCEDURE — 1123F ACP DISCUSS/DSCN MKR DOCD: CPT | Performed by: PHYSICIAN ASSISTANT

## 2024-07-05 PROCEDURE — 81002 URINALYSIS NONAUTO W/O SCOPE: CPT | Performed by: PHYSICIAN ASSISTANT

## 2024-07-05 PROCEDURE — 99213 OFFICE O/P EST LOW 20 MIN: CPT | Performed by: PHYSICIAN ASSISTANT

## 2024-07-05 PROCEDURE — 3078F DIAST BP <80 MM HG: CPT | Performed by: PHYSICIAN ASSISTANT

## 2024-07-05 PROCEDURE — 3074F SYST BP LT 130 MM HG: CPT | Performed by: PHYSICIAN ASSISTANT

## 2024-07-05 RX ORDER — CEPHALEXIN 500 MG/1
500 CAPSULE ORAL 2 TIMES DAILY
Qty: 14 CAPSULE | Refills: 0 | Status: SHIPPED | OUTPATIENT
Start: 2024-07-05 | End: 2024-07-12

## 2024-07-05 NOTE — PROGRESS NOTES
[facility]  [address] [place] [id number]  Phone: [phone]  Fax: [phone]       [facility] W[facility] - IN    Pt Name: Erica Matos  MRN: [id number]  Birthdate 1960  Date of evaluation: 7/5/2024  Provider: Leena Otto PA-C     CHIEF COMPLAINT       Chief Complaint   Patient presents with    Back Pain     lower    Dizziness           HISTORY OF PRESENT ILLNESS  (Location/Symptom, Timing/Onset, Context/Setting, Quality, Duration, Modifying Factors, Severity.)   Erica Matos is a 64 y.o. White (non-) [1] female who presents to the office for evaluation of      Back Pain  This is a new problem. The current episode started in the past 7 days. The problem has been waxing and waning since onset. The pain is present in the lumbar spine. The pain is mild. Pertinent negatives include no fever or numbness. (dizziness)       Nursing Notes were reviewed.    REVIEW OF SYSTEMS    (2-9 systems for level 4, 10 or more for level 5)     Review of Systems   Constitutional:  Positive for fatigue. Negative for chills, diaphoresis and fever.   Respiratory: Negative.     Cardiovascular: Negative.    Gastrointestinal: Negative.    Genitourinary: Negative.    Musculoskeletal:  Positive for back pain.   Neurological:  Positive for dizziness. Negative for seizures, light-headedness and numbness.         Except as noted above the remainder of the review of systems was reviewed andnegative.       PAST MEDICAL HISTORY   History reviewed.    Past Medical History:   Diagnosis Date    ADHD     Aortic regurgitation     Arthritis     Asthma     Atrial fibrillation, unspecified type (HCC)     Bullous pemphigoid     Cancer (HCC) 2019    neuroendocrine cancer    Cerebral artery occlusion with cerebral infarction (HCC)     Chest pain     COPD (chronic obstructive pulmonary disease) (HCC)     Depression     Diabetes mellitus (HCC)     Edema     Esophagitis     Fibromyalgia     Hepatitis     Hiatal hernia

## 2024-07-06 LAB
MICROORGANISM SPEC CULT: NO GROWTH
SERVICE CMNT-IMP: NORMAL
SPECIMEN DESCRIPTION: NORMAL

## 2024-07-09 ASSESSMENT — ENCOUNTER SYMPTOMS
RESPIRATORY NEGATIVE: 1
BACK PAIN: 1
GASTROINTESTINAL NEGATIVE: 1

## (undated) DEVICE — ELECTRODE PT RET AD L9FT HI MOIST COND ADH HYDRGEL CORDED

## (undated) DEVICE — PROTECTOR EYE PT SELF ADH NS OPT GRD LF

## (undated) DEVICE — AGENT HEMSTAT 8ML FLX TIP MTRX + DISP SURGIFLO

## (undated) DEVICE — 1010 S-DRAPE TOWEL DRAPE 10/BX: Brand: STERI-DRAPE™

## (undated) DEVICE — 4.0MM PRECISION ROUND

## (undated) DEVICE — Device

## (undated) DEVICE — TOTAL TRAY, DB, 100% SILI FOLEY, 16FR 10: Brand: MEDLINE

## (undated) DEVICE — GLOVE SURG SZ 8 L12IN FNGR THK79MIL GRN LTX FREE

## (undated) DEVICE — BLANKET WRM W40.2XL55.9IN IORT LO BODY + MISTRAL AIR

## (undated) DEVICE — DRAIN SURG FLAT W7MMXL20CM FULL PERF

## (undated) DEVICE — RESERVOIR,SUCTION,100CC,SILICONE: Brand: MEDLINE

## (undated) DEVICE — SUTURE VCRL SZ 3-0 L27IN ABSRB UD L26MM SH 1/2 CIR J416H

## (undated) DEVICE — CODMAN® SURGICAL PATTIES 1" X 1" (2.54CM X 2.54CM): Brand: CODMAN®

## (undated) DEVICE — GARMENT,MEDLINE,DVT,INT,CALF,MED, GEN2: Brand: MEDLINE

## (undated) DEVICE — ABS MED DISTRACTION PIN, 14MM PATIENT (INNER): Brand: ABS MED DISTRACTION PIN

## (undated) DEVICE — GOWN,AURORA,NON-REINFORCED,2XL: Brand: MEDLINE

## (undated) DEVICE — GLOVE SURG SZ 85 CRM LTX FREE POLYISOPRENE POLYMER BEAD ANTI

## (undated) DEVICE — PREP-RESISTANT MARKER W/ RULER: Brand: MEDLINE INDUSTRIES, INC.

## (undated) DEVICE — MEDI-TRACE CADENCE ADULT, DEFIBRILLATION ELECTRODE -RTS  (10 PR/PK) - PHYSIO-CONTROL: Brand: MEDI-TRACE CADENCE

## (undated) DEVICE — SPONGE,PEANUT,XRAY,ST,SM,3/8",5/CARD: Brand: MEDLINE INDUSTRIES, INC.

## (undated) DEVICE — KIT MICRO INTRO 4FR STIFF 40CM NIGHTENALL TUNGSTEN 7SMT

## (undated) DEVICE — SUTURE VICRYL 2-0 L27IN ABSRB CT BRAID COAT UD J275H

## (undated) DEVICE — DRILL BIT 7080510 11 MM DRILL BIT S

## (undated) DEVICE — TUBING, SUCTION, 1/4" X 12', STRAIGHT: Brand: MEDLINE

## (undated) DEVICE — TOWEL,OR,DSP,ST,BLUE,DLX,XR,4/PK,20PK/CS: Brand: MEDLINE

## (undated) DEVICE — 3M(TM) MEDIPORE(TM) +PAD SOFT CLOTH ADHESIVE WOUND DRESSING 3569: Brand: 3M™ MEDIPORE™

## (undated) DEVICE — GLOVE SURG SZ 8 CRM LTX FREE POLYISOPRENE POLYMER BEAD ANTI

## (undated) DEVICE — ELECTRODE ELECSURG NDL 2.8 INX7.2 CM COAT INSUL EDGE

## (undated) DEVICE — MASTISOL ADHESIVE LIQ 2/3ML

## (undated) DEVICE — SUTURE MCRYL SZ 4-0 L27IN ABSRB UD L19MM PS-2 1/2 CIR PRIM Y426H

## (undated) DEVICE — SPONGE LAP W18XL18IN WHT COT 4 PLY FLD STRUNG RADPQ DISP ST 2 PER PACK

## (undated) DEVICE — YANKAUER,FLEXIBLE HANDLE,REGLR CAPACITY: Brand: MEDLINE INDUSTRIES, INC.

## (undated) DEVICE — STRAP ARMBRD W1.5XL32IN FOAM STR YET SFT W/ HK AND LOOP

## (undated) DEVICE — ZINACTIVE USE 2539609 APPLICATOR MEDICATED 10.5 CC SOLUTION HI LT ORNG CHLORAPREP

## (undated) DEVICE — CORD,CAUTERY,BIPOLAR,STERILE: Brand: MEDLINE

## (undated) DEVICE — ADHESIVE SKIN CLSR 0.7ML TOP DERMBND ADV

## (undated) DEVICE — Z DISCONTINUED USE 2859063 SUTURE VICRYL 3-0 L27IN ABSRB UD PSL L30MM 1/2 CIR TAPERPOINT J502H

## (undated) DEVICE — DRAPE C ARM UNIV W41XL74IN CLR PLAS XR VELC CLSR POLY STRP